# Patient Record
Sex: FEMALE | Race: WHITE | Employment: FULL TIME | URBAN - NONMETROPOLITAN AREA
[De-identification: names, ages, dates, MRNs, and addresses within clinical notes are randomized per-mention and may not be internally consistent; named-entity substitution may affect disease eponyms.]

---

## 2017-01-27 ENCOUNTER — TELEPHONE (OUTPATIENT)
Dept: SURGERY | Age: 52
End: 2017-01-27

## 2017-02-08 ENCOUNTER — TELEPHONE (OUTPATIENT)
Dept: SURGERY | Age: 52
End: 2017-02-08

## 2017-02-14 ENCOUNTER — HOSPITAL ENCOUNTER (OUTPATIENT)
Age: 52
Setting detail: SPECIMEN
Discharge: HOME | End: 2017-02-14
Payer: COMMERCIAL

## 2017-02-14 ENCOUNTER — OFFICE VISIT (OUTPATIENT)
Dept: OBGYN | Age: 52
End: 2017-02-14
Payer: COMMERCIAL

## 2017-02-14 VITALS
WEIGHT: 134 LBS | SYSTOLIC BLOOD PRESSURE: 112 MMHG | BODY MASS INDEX: 21.03 KG/M2 | DIASTOLIC BLOOD PRESSURE: 77 MMHG | HEART RATE: 65 BPM | HEIGHT: 67 IN

## 2017-02-14 DIAGNOSIS — Z01.419 WELL WOMAN EXAM WITH ROUTINE GYNECOLOGICAL EXAM: Primary | ICD-10-CM

## 2017-02-14 DIAGNOSIS — Z12.4 CERVICAL CANCER SCREENING: ICD-10-CM

## 2017-02-14 PROCEDURE — 87624 HPV HI-RISK TYP POOLED RSLT: CPT

## 2017-02-14 PROCEDURE — 88142 CYTOPATH C/V THIN LAYER: CPT

## 2017-02-14 PROCEDURE — 99396 PREV VISIT EST AGE 40-64: CPT | Performed by: NURSE PRACTITIONER

## 2017-02-14 ASSESSMENT — ENCOUNTER SYMPTOMS
EYES NEGATIVE: 1
GASTROINTESTINAL NEGATIVE: 1
RESPIRATORY NEGATIVE: 1

## 2017-02-16 ENCOUNTER — OFFICE VISIT (OUTPATIENT)
Dept: OTOLARYNGOLOGY | Age: 52
End: 2017-02-16
Payer: COMMERCIAL

## 2017-02-16 VITALS
OXYGEN SATURATION: 97 % | HEART RATE: 66 BPM | HEIGHT: 67 IN | BODY MASS INDEX: 21.06 KG/M2 | SYSTOLIC BLOOD PRESSURE: 118 MMHG | DIASTOLIC BLOOD PRESSURE: 78 MMHG | WEIGHT: 134.2 LBS

## 2017-02-16 DIAGNOSIS — H61.22 IMPACTED CERUMEN OF LEFT EAR: Primary | ICD-10-CM

## 2017-02-16 DIAGNOSIS — E04.1 THYROID NODULE: ICD-10-CM

## 2017-02-16 PROCEDURE — 99202 OFFICE O/P NEW SF 15 MIN: CPT | Performed by: OTOLARYNGOLOGY

## 2017-02-16 PROCEDURE — 69210 REMOVE IMPACTED EAR WAX UNI: CPT | Performed by: OTOLARYNGOLOGY

## 2017-02-16 ASSESSMENT — ENCOUNTER SYMPTOMS
EYES NEGATIVE: 1
GASTROINTESTINAL NEGATIVE: 1
RESPIRATORY NEGATIVE: 1
ALLERGIC/IMMUNOLOGIC NEGATIVE: 1

## 2017-02-21 ENCOUNTER — HOSPITAL ENCOUNTER (OUTPATIENT)
Dept: ULTRASOUND IMAGING | Age: 52
Discharge: HOME | End: 2017-02-21
Payer: COMMERCIAL

## 2017-02-21 DIAGNOSIS — E04.1 THYROID NODULE: ICD-10-CM

## 2017-02-21 LAB
HPV SOURCE: NORMAL
HPV, HIGH RISK: NEGATIVE

## 2017-02-21 PROCEDURE — 76536 US EXAM OF HEAD AND NECK: CPT

## 2017-03-13 ENCOUNTER — TELEPHONE (OUTPATIENT)
Dept: OTOLARYNGOLOGY | Age: 52
End: 2017-03-13

## 2017-03-23 ENCOUNTER — EMPLOYEE WELLNESS (OUTPATIENT)
Dept: OTHER | Age: 52
End: 2017-03-23

## 2017-03-23 LAB
CHOLESTEROL, TOTAL: 189 MG/DL (ref 160–199)
GLUCOSE BLD-MCNC: 84 MG/DL (ref 74–109)
HDLC SERPL-MCNC: 83 MG/DL (ref 65–121)
LDL CHOLESTEROL CALCULATED: 91 MG/DL
TRIGL SERPL-MCNC: 74 MG/DL (ref 150–199)

## 2017-04-24 ENCOUNTER — HOSPITAL ENCOUNTER (OUTPATIENT)
Dept: WOMENS IMAGING | Age: 52
Discharge: HOME | End: 2017-04-24
Payer: COMMERCIAL

## 2017-04-24 DIAGNOSIS — Z12.31 VISIT FOR SCREENING MAMMOGRAM: ICD-10-CM

## 2017-04-24 PROCEDURE — 77063 BREAST TOMOSYNTHESIS BI: CPT

## 2017-04-25 ENCOUNTER — TELEPHONE (OUTPATIENT)
Dept: WOMENS IMAGING | Age: 52
End: 2017-04-25

## 2017-04-26 ENCOUNTER — HOSPITAL ENCOUNTER (OUTPATIENT)
Dept: WOMENS IMAGING | Age: 52
Discharge: HOME | End: 2017-04-26
Payer: COMMERCIAL

## 2017-04-26 DIAGNOSIS — N64.89 BREAST ASYMMETRY: ICD-10-CM

## 2017-04-26 PROCEDURE — G0279 TOMOSYNTHESIS, MAMMO: HCPCS

## 2017-04-28 ENCOUNTER — OFFICE VISIT (OUTPATIENT)
Dept: SURGERY | Age: 52
End: 2017-04-28
Payer: COMMERCIAL

## 2017-04-28 VITALS
HEIGHT: 67 IN | DIASTOLIC BLOOD PRESSURE: 60 MMHG | WEIGHT: 131 LBS | HEART RATE: 68 BPM | BODY MASS INDEX: 20.56 KG/M2 | SYSTOLIC BLOOD PRESSURE: 110 MMHG

## 2017-04-28 DIAGNOSIS — Z12.31 VISIT FOR SCREENING MAMMOGRAM: Primary | ICD-10-CM

## 2017-04-28 PROCEDURE — 99212 OFFICE O/P EST SF 10 MIN: CPT | Performed by: PHYSICIAN ASSISTANT

## 2017-10-13 DIAGNOSIS — Z13.820 OSTEOPOROSIS SCREENING: Primary | ICD-10-CM

## 2017-10-30 ENCOUNTER — HOSPITAL ENCOUNTER (OUTPATIENT)
Dept: WOMENS IMAGING | Age: 52
Discharge: HOME | End: 2017-10-30
Payer: COMMERCIAL

## 2017-10-30 DIAGNOSIS — Z13.820 OSTEOPOROSIS SCREENING: ICD-10-CM

## 2017-10-30 PROCEDURE — 77080 DXA BONE DENSITY AXIAL: CPT

## 2017-11-04 DIAGNOSIS — M85.80 OSTEOPENIA, UNSPECIFIED LOCATION: Primary | ICD-10-CM

## 2017-11-04 RX ORDER — VITAMIN B COMPLEX
TABLET ORAL
Qty: 180 TABLET | Refills: 5 | Status: SHIPPED | OUTPATIENT
Start: 2017-11-04

## 2017-11-06 ENCOUNTER — OFFICE VISIT (OUTPATIENT)
Dept: PRIMARY CARE CLINIC | Age: 52
End: 2017-11-06
Payer: COMMERCIAL

## 2017-11-06 VITALS
HEIGHT: 67 IN | BODY MASS INDEX: 20.4 KG/M2 | DIASTOLIC BLOOD PRESSURE: 78 MMHG | RESPIRATION RATE: 18 BRPM | WEIGHT: 130 LBS | SYSTOLIC BLOOD PRESSURE: 110 MMHG

## 2017-11-06 DIAGNOSIS — E04.1 THYROID NODULE: ICD-10-CM

## 2017-11-06 DIAGNOSIS — M85.80 OSTEOPENIA, UNSPECIFIED LOCATION: ICD-10-CM

## 2017-11-06 DIAGNOSIS — Z00.00 ROUTINE GENERAL MEDICAL EXAMINATION AT A HEALTH CARE FACILITY: Primary | ICD-10-CM

## 2017-11-06 DIAGNOSIS — E55.9 VITAMIN D DEFICIENCY: ICD-10-CM

## 2017-11-06 DIAGNOSIS — M54.2 CERVICALGIA: ICD-10-CM

## 2017-11-06 DIAGNOSIS — M54.50 ACUTE MIDLINE LOW BACK PAIN WITHOUT SCIATICA: ICD-10-CM

## 2017-11-06 LAB — VITAMIN D 25-HYDROXY: 49.3 NG/ML

## 2017-11-06 PROCEDURE — 99213 OFFICE O/P EST LOW 20 MIN: CPT | Performed by: FAMILY MEDICINE

## 2017-11-06 PROCEDURE — G0439 PPPS, SUBSEQ VISIT: HCPCS | Performed by: FAMILY MEDICINE

## 2017-11-06 RX ORDER — METAXALONE 800 MG/1
800 TABLET ORAL 3 TIMES DAILY
Qty: 90 TABLET | Refills: 2 | Status: SHIPPED | OUTPATIENT
Start: 2017-11-06 | End: 2018-11-06 | Stop reason: SDUPTHER

## 2017-11-06 ASSESSMENT — ENCOUNTER SYMPTOMS
COUGH: 0
CONSTIPATION: 0
EYE ITCHING: 0
DIARRHEA: 0
SORE THROAT: 0
ABDOMINAL PAIN: 0
BACK PAIN: 1
NAUSEA: 0
COLOR CHANGE: 0
SHORTNESS OF BREATH: 0
RHINORRHEA: 0

## 2017-11-06 NOTE — PROGRESS NOTES
Abram Henry is a 47 y.o. female who presents today for   Chief Complaint   Patient presents with    Annual Exam     Separate encounter    Back Pain       HPI   Patient is here for 2 reasons: annual wellness visit as well as acute and chronic issues. The below review of systems and physical exam applies to both encounters. Annual HPI:  Patient is here for annual exam.  She had colonoscopy last year. Had dexa scan last week, shows osteopenia. Sees gynecology for routine woman's health. Remains active,. Denies any family history of any substantial early-stage cardiovascular risk, breast cancer colon cancer. Has questions about new shingles vaccine. She does not want to get it. She comes in contact with this work as well. Acute/Chronic HPI:  Has h/o vitamin D deficiency and was on 50,000 unit per week supple and previously. Currently taking 2000 units daily. Has some chronic neck pain but controlled with ibuprofen as well as Skelaxin. She does have some occasional back pain has made it difficult to jog and exercise. Getting worse of age. Denies loss of urinary or bowel continence, saddle anesthesia, muscle weakness or lower extremity sensation or function changes. No change in PMH, family, social, or surgical history unless mentioned above. Review of Systems   Constitutional: Negative for chills and fever. HENT: Negative for rhinorrhea and sore throat. Eyes: Negative for itching and visual disturbance. Respiratory: Negative for cough and shortness of breath. Cardiovascular: Negative for chest pain and palpitations. Gastrointestinal: Negative for abdominal pain, constipation, diarrhea and nausea. Endocrine: Negative for polydipsia and polyuria. Genitourinary: Negative for difficulty urinating, dysuria and frequency. Musculoskeletal: Positive for back pain and neck pain. Negative for arthralgias and myalgias. Skin: Negative for color change and rash. Allergic/Immunologic: Negative for environmental allergies and food allergies. Neurological: Negative for dizziness, weakness, light-headedness and numbness. Hematological: Negative for adenopathy. Does not bruise/bleed easily. Psychiatric/Behavioral: Negative for dysphoric mood. The patient is not nervous/anxious. Past Medical History:   Diagnosis Date    Thyroid nodule        Current Outpatient Prescriptions   Medication Sig Dispense Refill    metaxalone (SKELAXIN) 800 MG tablet Take 1 tablet by mouth 3 times daily 90 tablet 2    zoster vaccine live, PF, (ZOSTAVAX) 19511 UNT/0.65ML injection Inject 0.65 mLs into the skin once for 1 dose 0.65 mL 0    Calcium Carb-Cholecalciferol (CALCIUM-VITAMIN D) 500-400 MG-UNIT TABS 2 tabs daily 180 tablet 5    Cholecalciferol (VITAMIN D3) 5000 UNITS TABS Take by mouth      Multiple Vitamins-Minerals (MULTIVITAL PO) Take  by mouth.  Calcium-Vitamin D (CALCIUM + D PO) Take  by mouth. No current facility-administered medications for this visit.         No Known Allergies    Past Surgical History:   Procedure Laterality Date    BREAST CYST ASPIRATION Right 2007    BREAST CYST ASPIRATION  2016    BREAST CYST ASPIRATION  2015    DILATION AND CURETTAGE OF UTERUS  1991    ND COLONOSCOPY FLX DX W/COLLJ SPEC WHEN PFRMD N/A 12/8/2016    Dr Carlos oRbertson bleeding small to moderated sized internal hemorrhoids, tubulovillous AP, (- ) dysplasia--3 yr recall       Social History   Substance Use Topics    Smoking status: Never Smoker    Smokeless tobacco: Never Used    Alcohol use Yes      Comment: occasionally       Family History   Problem Relation Age of Onset    Heart Disease Paternal Grandfather     High Cholesterol Paternal Grandmother     Breast Cancer Maternal Grandmother [de-identified]    High Blood Pressure Father     High Cholesterol Father     Prostate Cancer Father     High Blood Pressure Mother     High Cholesterol Mother     Immunodeficiency Mother      low IGG       /78   Resp 18   Ht 5' 7\" (1.702 m)   Wt 130 lb (59 kg)   BMI 20.36 kg/m²     Physical Exam   Constitutional: She is oriented to person, place, and time. She appears well-developed and well-nourished. Non-toxic appearance. No distress. HENT:   Head: Normocephalic and atraumatic. Not macrocephalic and not microcephalic. Right Ear: External ear normal. No drainage. No decreased hearing is noted. Left Ear: External ear normal. No drainage. No decreased hearing is noted. Nose: Nose normal. No rhinorrhea or nasal deformity. Mouth/Throat: Uvula is midline, oropharynx is clear and moist and mucous membranes are normal. Mucous membranes are not pale and not dry. Eyes: Conjunctivae, EOM and lids are normal. Pupils are equal, round, and reactive to light. Right eye exhibits no discharge. Left eye exhibits no discharge. No scleral icterus. Neck: Normal range of motion and phonation normal. Neck supple. No tracheal deviation present. No thyromegaly present. Cardiovascular: Normal rate, regular rhythm, S1 normal, S2 normal and normal heart sounds. No extrasystoles are present. No murmur heard. Pulmonary/Chest: Effort normal and breath sounds normal. No respiratory distress. She has no wheezes. She has no rhonchi. She has no rales. Abdominal: Soft. Bowel sounds are normal. She exhibits no distension. There is no tenderness. There is no guarding. Musculoskeletal: She exhibits no edema. Cervical back: She exhibits decreased range of motion and tenderness. She exhibits no bony tenderness. Thoracic back: Normal. She exhibits no tenderness and no bony tenderness. Lumbar back: She exhibits decreased range of motion and tenderness. She exhibits no bony tenderness and no spasm. Right lower leg: She exhibits no swelling and no edema. Left lower leg: She exhibits no swelling and no edema. Lymphadenopathy:        Head (right side):  No

## 2018-03-06 ENCOUNTER — OFFICE VISIT (OUTPATIENT)
Dept: OBGYN | Age: 53
End: 2018-03-06
Payer: COMMERCIAL

## 2018-03-06 ENCOUNTER — HOSPITAL ENCOUNTER (OUTPATIENT)
Age: 53
Setting detail: SPECIMEN
Discharge: HOME | End: 2018-03-06
Payer: COMMERCIAL

## 2018-03-06 VITALS
WEIGHT: 130 LBS | SYSTOLIC BLOOD PRESSURE: 122 MMHG | BODY MASS INDEX: 20.4 KG/M2 | HEIGHT: 67 IN | DIASTOLIC BLOOD PRESSURE: 66 MMHG

## 2018-03-06 DIAGNOSIS — Z12.4 CERVICAL CANCER SCREENING: ICD-10-CM

## 2018-03-06 DIAGNOSIS — Z01.419 WELL FEMALE EXAM WITH ROUTINE GYNECOLOGICAL EXAM: Primary | ICD-10-CM

## 2018-03-06 PROCEDURE — 88142 CYTOPATH C/V THIN LAYER: CPT

## 2018-03-06 PROCEDURE — 99396 PREV VISIT EST AGE 40-64: CPT | Performed by: NURSE PRACTITIONER

## 2018-03-06 PROCEDURE — 87624 HPV HI-RISK TYP POOLED RSLT: CPT

## 2018-03-06 ASSESSMENT — ENCOUNTER SYMPTOMS
EYES NEGATIVE: 1
GASTROINTESTINAL NEGATIVE: 1
RESPIRATORY NEGATIVE: 1

## 2018-03-06 NOTE — PROGRESS NOTES
Ruddy Zendejas is a 47 y.o. female who presents today for her medical conditions/ complaints as noted below. Ruddy Zendejas is c/o of Gynecologic Exam        HPI  Pt here for annual and pap. Mammograms watched per Kunal Garrison at general surgery. Last mammogram: 4/2017, Michael Fink orders   Last pap: 2017  Contraception: none  Last bone density:2017  Last colonoscopy: 12/2016  Patient's last menstrual period was 11/01/2017. No obstetric history on file. Past Medical History:   Diagnosis Date    Thyroid nodule      Past Surgical History:   Procedure Laterality Date    BREAST CYST ASPIRATION Right 2007    BREAST CYST ASPIRATION  2016    BREAST CYST ASPIRATION  2015    DILATION AND CURETTAGE OF UTERUS  1991    NE COLONOSCOPY FLX DX W/COLLJ SPEC WHEN PFRMD N/A 12/8/2016    Dr Mary Arriaga bleeding small to moderated sized internal hemorrhoids, tubulovillous AP, (- ) dysplasia--3 yr recall     Family History   Problem Relation Age of Onset    Heart Disease Paternal Grandfather     High Cholesterol Paternal Grandmother     Breast Cancer Maternal Grandmother [de-identified]    High Blood Pressure Father     High Cholesterol Father     Prostate Cancer Father     High Blood Pressure Mother     High Cholesterol Mother     Immunodeficiency Mother      low IGG     Social History   Substance Use Topics    Smoking status: Never Smoker    Smokeless tobacco: Never Used    Alcohol use Yes      Comment: occasionally       Current Outpatient Prescriptions   Medication Sig Dispense Refill    metaxalone (SKELAXIN) 800 MG tablet Take 1 tablet by mouth 3 times daily 90 tablet 2    Calcium Carb-Cholecalciferol (CALCIUM-VITAMIN D) 500-400 MG-UNIT TABS 2 tabs daily 180 tablet 5    Multiple Vitamins-Minerals (MULTIVITAL PO) Take  by mouth.  Cholecalciferol (VITAMIN D3) 5000 UNITS TABS Take by mouth      Calcium-Vitamin D (CALCIUM + D PO) Take  by mouth. No current facility-administered medications for this visit.       No Known Allergies  Vitals:    03/06/18 1558   BP: 122/66     Body mass index is 20.36 kg/m². Review of Systems   Constitutional: Negative. HENT: Negative. Eyes: Negative. Respiratory: Negative. Cardiovascular: Negative. Gastrointestinal: Negative. Genitourinary: Negative for difficulty urinating, dyspareunia, dysuria, enuresis, frequency, hematuria, menstrual problem, pelvic pain, urgency and vaginal discharge. Musculoskeletal: Negative. Skin: Negative. Neurological: Negative. Psychiatric/Behavioral: Negative. Physical Exam   Constitutional: She is oriented to person, place, and time. She appears well-developed and well-nourished. Eyes: Conjunctivae are normal. Right eye exhibits no discharge. Neck: No thyroid mass and no thyromegaly present. Cardiovascular: Normal rate, regular rhythm and normal heart sounds. No murmur heard. Pulmonary/Chest: Effort normal and breath sounds normal. She has no wheezes. Right breast exhibits no inverted nipple, no mass, no nipple discharge, no skin change and no tenderness. Left breast exhibits no inverted nipple, no mass, no nipple discharge, no skin change and no tenderness. Breasts are symmetrical.   Abdominal: Soft. Bowel sounds are normal. She exhibits no distension and no mass. There is no tenderness. Hernia confirmed negative in the right inguinal area and confirmed negative in the left inguinal area. Genitourinary: Rectal exam shows no external hemorrhoid. No breast swelling, tenderness or discharge. There is no rash, tenderness or lesion on the right labia. There is no rash, tenderness or lesion on the left labia. Uterus is not enlarged and not tender. Cervix exhibits no motion tenderness and no discharge (normal cervical mucosa). Right adnexum displays no mass, no tenderness and no fullness. Left adnexum displays no mass, no tenderness and no fullness. No tenderness in the vagina. No vaginal discharge found.    Genitourinary

## 2018-03-13 LAB
HPV SOURCE: NORMAL
HPV, HIGH RISK: NEGATIVE

## 2018-03-20 ENCOUNTER — TELEPHONE (OUTPATIENT)
Dept: SURGERY | Age: 53
End: 2018-03-20

## 2018-03-20 VITALS — WEIGHT: 133 LBS | BODY MASS INDEX: 20.83 KG/M2

## 2018-03-28 ENCOUNTER — EMPLOYEE WELLNESS (OUTPATIENT)
Dept: OTHER | Age: 53
End: 2018-03-28

## 2018-03-28 LAB
CHOLESTEROL, TOTAL: 198 MG/DL (ref 160–199)
GLUCOSE BLD-MCNC: 87 MG/DL (ref 74–109)
HDLC SERPL-MCNC: 91 MG/DL (ref 65–121)
LDL CHOLESTEROL CALCULATED: 96 MG/DL
TRIGL SERPL-MCNC: 54 MG/DL (ref 0–149)

## 2018-04-02 VITALS — BODY MASS INDEX: 20.2 KG/M2 | WEIGHT: 129 LBS

## 2018-04-25 ENCOUNTER — HOSPITAL ENCOUNTER (OUTPATIENT)
Dept: WOMENS IMAGING | Age: 53
Discharge: HOME | End: 2018-04-25
Payer: COMMERCIAL

## 2018-04-25 DIAGNOSIS — Z12.31 VISIT FOR SCREENING MAMMOGRAM: ICD-10-CM

## 2018-04-25 PROCEDURE — 77063 BREAST TOMOSYNTHESIS BI: CPT

## 2018-04-27 ENCOUNTER — OFFICE VISIT (OUTPATIENT)
Dept: SURGERY | Age: 53
End: 2018-04-27
Payer: COMMERCIAL

## 2018-04-27 VITALS
BODY MASS INDEX: 20.62 KG/M2 | HEART RATE: 72 BPM | WEIGHT: 131.4 LBS | SYSTOLIC BLOOD PRESSURE: 118 MMHG | DIASTOLIC BLOOD PRESSURE: 72 MMHG | HEIGHT: 67 IN

## 2018-04-27 DIAGNOSIS — Z12.39 SCREENING BREAST EXAMINATION: Primary | ICD-10-CM

## 2018-04-27 PROCEDURE — 99212 OFFICE O/P EST SF 10 MIN: CPT | Performed by: PHYSICIAN ASSISTANT

## 2018-07-02 RX ORDER — AZITHROMYCIN 250 MG/1
TABLET, FILM COATED ORAL
Qty: 1 PACKET | Refills: 1 | Status: SHIPPED | OUTPATIENT
Start: 2018-07-02 | End: 2018-07-06

## 2018-11-06 ENCOUNTER — OFFICE VISIT (OUTPATIENT)
Dept: PRIMARY CARE CLINIC | Age: 53
End: 2018-11-06
Payer: COMMERCIAL

## 2018-11-06 VITALS
OXYGEN SATURATION: 99 % | WEIGHT: 121 LBS | RESPIRATION RATE: 20 BRPM | DIASTOLIC BLOOD PRESSURE: 68 MMHG | SYSTOLIC BLOOD PRESSURE: 98 MMHG | HEIGHT: 67 IN | BODY MASS INDEX: 18.99 KG/M2 | HEART RATE: 77 BPM

## 2018-11-06 DIAGNOSIS — Z00.00 ROUTINE GENERAL MEDICAL EXAMINATION AT A HEALTH CARE FACILITY: Primary | ICD-10-CM

## 2018-11-06 PROCEDURE — 99396 PREV VISIT EST AGE 40-64: CPT | Performed by: FAMILY MEDICINE

## 2018-11-06 PROCEDURE — 90715 TDAP VACCINE 7 YRS/> IM: CPT | Performed by: FAMILY MEDICINE

## 2018-11-06 PROCEDURE — 90471 IMMUNIZATION ADMIN: CPT | Performed by: FAMILY MEDICINE

## 2018-11-06 RX ORDER — MULTIVIT-MIN/IRON/FOLIC ACID/K 18-600-40
CAPSULE ORAL
COMMUNITY

## 2018-11-06 RX ORDER — METAXALONE 800 MG/1
800 TABLET ORAL 3 TIMES DAILY
Qty: 90 TABLET | Refills: 2 | Status: SHIPPED | OUTPATIENT
Start: 2018-11-06 | End: 2022-04-01

## 2018-11-06 ASSESSMENT — ENCOUNTER SYMPTOMS
SORE THROAT: 0
SHORTNESS OF BREATH: 0
COLOR CHANGE: 0
NAUSEA: 0
RHINORRHEA: 0
EYE ITCHING: 0
ABDOMINAL PAIN: 0
COUGH: 0

## 2018-11-06 ASSESSMENT — PATIENT HEALTH QUESTIONNAIRE - PHQ9
SUM OF ALL RESPONSES TO PHQ QUESTIONS 1-9: 0
SUM OF ALL RESPONSES TO PHQ9 QUESTIONS 1 & 2: 0
2. FEELING DOWN, DEPRESSED OR HOPELESS: 0
1. LITTLE INTEREST OR PLEASURE IN DOING THINGS: 0
SUM OF ALL RESPONSES TO PHQ QUESTIONS 1-9: 0

## 2018-11-21 DIAGNOSIS — Z00.00 ROUTINE GENERAL MEDICAL EXAMINATION AT A HEALTH CARE FACILITY: ICD-10-CM

## 2018-11-21 LAB
ALBUMIN SERPL-MCNC: 4.3 G/DL (ref 3.5–5.2)
ALP BLD-CCNC: 64 U/L (ref 35–104)
ALT SERPL-CCNC: 16 U/L (ref 5–33)
ANION GAP SERPL CALCULATED.3IONS-SCNC: 10 MMOL/L (ref 7–19)
AST SERPL-CCNC: 21 U/L (ref 5–32)
BILIRUB SERPL-MCNC: 0.5 MG/DL (ref 0.2–1.2)
BUN BLDV-MCNC: 18 MG/DL (ref 6–20)
CALCIUM SERPL-MCNC: 9.7 MG/DL (ref 8.6–10)
CHLORIDE BLD-SCNC: 102 MMOL/L (ref 98–111)
CO2: 28 MMOL/L (ref 22–29)
CREAT SERPL-MCNC: 0.7 MG/DL (ref 0.5–0.9)
GFR NON-AFRICAN AMERICAN: >60
GLUCOSE BLD-MCNC: 96 MG/DL (ref 74–109)
POTASSIUM SERPL-SCNC: 4.2 MMOL/L (ref 3.5–5)
SODIUM BLD-SCNC: 140 MMOL/L (ref 136–145)
TOTAL PROTEIN: 7.1 G/DL (ref 6.6–8.7)

## 2018-12-01 ENCOUNTER — PATIENT MESSAGE (OUTPATIENT)
Dept: PRIMARY CARE CLINIC | Age: 53
End: 2018-12-01

## 2018-12-18 RX ORDER — AZITHROMYCIN 250 MG/1
TABLET, FILM COATED ORAL
Qty: 6 TABLET | Refills: 0 | Status: SHIPPED | OUTPATIENT
Start: 2018-12-18 | End: 2018-12-28

## 2019-03-08 ENCOUNTER — OFFICE VISIT (OUTPATIENT)
Dept: OBGYN | Age: 54
End: 2019-03-08
Payer: COMMERCIAL

## 2019-03-08 VITALS
HEIGHT: 67 IN | BODY MASS INDEX: 21.19 KG/M2 | DIASTOLIC BLOOD PRESSURE: 72 MMHG | HEART RATE: 61 BPM | WEIGHT: 135 LBS | TEMPERATURE: 98.5 F | SYSTOLIC BLOOD PRESSURE: 111 MMHG

## 2019-03-08 DIAGNOSIS — Z11.51 SCREENING FOR HPV (HUMAN PAPILLOMAVIRUS): ICD-10-CM

## 2019-03-08 DIAGNOSIS — Z12.4 SCREENING FOR CERVICAL CANCER: ICD-10-CM

## 2019-03-08 DIAGNOSIS — N63.15 BREAST LUMP ON RIGHT SIDE AT 12 O'CLOCK POSITION: ICD-10-CM

## 2019-03-08 DIAGNOSIS — Z01.419 WELL WOMAN EXAM: Primary | ICD-10-CM

## 2019-03-08 PROCEDURE — 99396 PREV VISIT EST AGE 40-64: CPT | Performed by: NURSE PRACTITIONER

## 2019-03-08 ASSESSMENT — ENCOUNTER SYMPTOMS
EYES NEGATIVE: 1
GASTROINTESTINAL NEGATIVE: 1
RESPIRATORY NEGATIVE: 1

## 2019-03-13 LAB
HPV TYPE 16: NOT DETECTED
HPV TYPE 18: NOT DETECTED
INTERPRETATION: NORMAL
OTHER HIGH RISK HPV: NOT DETECTED
SOURCE: NORMAL

## 2019-04-05 LAB
CHOLESTEROL, TOTAL: 221 MG/DL (ref 160–199)
GLUCOSE BLD-MCNC: 85 MG/DL (ref 74–109)
HDLC SERPL-MCNC: 90 MG/DL (ref 65–121)
LDL CHOLESTEROL CALCULATED: 116 MG/DL
TRIGL SERPL-MCNC: 73 MG/DL (ref 0–149)

## 2019-04-12 ENCOUNTER — TELEPHONE (OUTPATIENT)
Dept: SURGERY | Age: 54
End: 2019-04-12

## 2019-04-12 NOTE — TELEPHONE ENCOUNTER
Patient called regarding upcoming mammogram appointment, she said she always has f/u with Debby Green after and she hasn't heard from anyone, please call.     CC: Lucio Walton

## 2019-04-26 ENCOUNTER — HOSPITAL ENCOUNTER (OUTPATIENT)
Dept: WOMENS IMAGING | Age: 54
Discharge: HOME | End: 2019-04-26
Payer: COMMERCIAL

## 2019-04-26 DIAGNOSIS — Z12.39 SCREENING BREAST EXAMINATION: ICD-10-CM

## 2019-04-26 PROCEDURE — 77063 BREAST TOMOSYNTHESIS BI: CPT

## 2019-05-17 ENCOUNTER — OFFICE VISIT (OUTPATIENT)
Dept: SURGERY | Age: 54
End: 2019-05-17
Payer: COMMERCIAL

## 2019-05-17 VITALS
DIASTOLIC BLOOD PRESSURE: 76 MMHG | HEIGHT: 67 IN | WEIGHT: 137 LBS | HEART RATE: 74 BPM | BODY MASS INDEX: 21.5 KG/M2 | SYSTOLIC BLOOD PRESSURE: 118 MMHG

## 2019-05-17 DIAGNOSIS — Z12.39 SCREENING BREAST EXAMINATION: Primary | ICD-10-CM

## 2019-05-17 PROCEDURE — 99213 OFFICE O/P EST LOW 20 MIN: CPT | Performed by: PHYSICIAN ASSISTANT

## 2019-05-31 NOTE — PROGRESS NOTES
HPI:  Rafael Ramos is in for yearly follow-up breast check. She has not noticed any changes in her breasts. EXAMINATION: [facility] DIGITAL SCREEN W OR WO CAD BILATERAL 4/26/2019 9:07   AM   HISTORY: 80-year-old female with a weak family history of breast   carcinoma, personal history of bilateral benign breast biopsies. Report: Today's examination consists of standard craniocaudal and   oblique views of both breasts.  3D tomographic views are also   obtained. Comparison is made with screening digital mammograms   4/25/2018, 4/24/2017 4/19/2016. The breast parenchyma is moderately dense, in a Pattern C parenchymal   pattern, which may lower the sensitivity of the study. No dominant   mass or parenchymal distortion is identified. There is a group of   calcifications in the left upper outer quadrant which are stable and   appear to represent benign milk of calcium. No suspicious   microcalcifications, skin thickening or nipple retraction is   identified. There is no significant change since the previous study.       Impression   Impression:    1. Stable mammograms, no suspicious features are identified. Annual   screening is recommended. 2. BI-RADS category 2, benign. BREAST EXAM:  On examination, she has fibrocystic changes throughout both breasts, no dominant masses, no skin or nipple changes and no axillary adenopathy. I see nothing suspicious for breast cancer. ASSESSMENT:  Benign fibrocystic changes                                 DISCUSSION:  I have stressed the importance of self breast exam and have explained the technique to her. We also discussed the pathophysiology of fibrocystic disease and breast cancer. She expresses good understanding. We also discussed multiple other issues regarding her and her family's health. PLAN:  I will plan to see her back in 1 year for physical exam and mammograms. She will contact me if anything significant changes.       I spent over 50% of visit time counseling patient. 15 minutes of face to face time with patient.

## 2019-11-07 ENCOUNTER — OFFICE VISIT (OUTPATIENT)
Dept: PRIMARY CARE CLINIC | Age: 54
End: 2019-11-07
Payer: COMMERCIAL

## 2019-11-07 VITALS
SYSTOLIC BLOOD PRESSURE: 118 MMHG | BODY MASS INDEX: 21.79 KG/M2 | WEIGHT: 138.8 LBS | HEIGHT: 67 IN | OXYGEN SATURATION: 98 % | RESPIRATION RATE: 14 BRPM | TEMPERATURE: 97.8 F | DIASTOLIC BLOOD PRESSURE: 76 MMHG | HEART RATE: 71 BPM

## 2019-11-07 DIAGNOSIS — M85.80 OSTEOPENIA, UNSPECIFIED LOCATION: ICD-10-CM

## 2019-11-07 DIAGNOSIS — R59.0 LYMPHADENOPATHY OF LEFT CERVICAL REGION: ICD-10-CM

## 2019-11-07 DIAGNOSIS — Z00.00 ROUTINE GENERAL MEDICAL EXAMINATION AT A HEALTH CARE FACILITY: Primary | ICD-10-CM

## 2019-11-07 DIAGNOSIS — H61.23 CERUMEN DEBRIS ON TYMPANIC MEMBRANE OF BOTH EARS: ICD-10-CM

## 2019-11-07 PROCEDURE — 99396 PREV VISIT EST AGE 40-64: CPT | Performed by: FAMILY MEDICINE

## 2019-11-07 ASSESSMENT — ENCOUNTER SYMPTOMS
NAUSEA: 0
BACK PAIN: 0
ABDOMINAL PAIN: 0
COUGH: 0
EYE ITCHING: 0
SHORTNESS OF BREATH: 0
SORE THROAT: 0
RHINORRHEA: 0
COLOR CHANGE: 0

## 2019-11-07 ASSESSMENT — PATIENT HEALTH QUESTIONNAIRE - PHQ9
SUM OF ALL RESPONSES TO PHQ QUESTIONS 1-9: 0
SUM OF ALL RESPONSES TO PHQ9 QUESTIONS 1 & 2: 0
1. LITTLE INTEREST OR PLEASURE IN DOING THINGS: 0
2. FEELING DOWN, DEPRESSED OR HOPELESS: 0
SUM OF ALL RESPONSES TO PHQ QUESTIONS 1-9: 0

## 2020-01-15 ENCOUNTER — TELEPHONE (OUTPATIENT)
Dept: GASTROENTEROLOGY | Age: 55
End: 2020-01-15

## 2020-01-15 NOTE — TELEPHONE ENCOUNTER
Gibran Russell, Mrs Priti Harris called and is due for a colon recall, and would like to have this done open access. Could you please call her to get this scheduled for her if she qualifies. Thanks so much!

## 2020-01-17 NOTE — TELEPHONE ENCOUNTER
This patient is now scheduled for OA Colonoscopy/Screen/hx polyp at [facility] with Mary Oneill on 2-10-20 @ 7:30 am arrival time. Patient works at [employer] and will come by the office to  sample of Plenvu Prep and her instructions, I will place them at  for her.  Carlos hare

## 2020-01-17 NOTE — TELEPHONE ENCOUNTER
1-17-20    I have called this patient on 1-17-20, she did not answer her phone, I left a VM @ 11:04 asking for a return call to discuss OA for her CLN Recall.  Carlos hare

## 2020-02-10 ENCOUNTER — APPOINTMENT (OUTPATIENT)
Dept: OPERATING ROOM | Age: 55
End: 2020-02-10

## 2020-02-10 ENCOUNTER — ANESTHESIA (OUTPATIENT)
Dept: OPERATING ROOM | Age: 55
End: 2020-02-10

## 2020-02-10 ENCOUNTER — ANESTHESIA EVENT (OUTPATIENT)
Dept: OPERATING ROOM | Age: 55
End: 2020-02-10

## 2020-02-10 ENCOUNTER — HOSPITAL ENCOUNTER (OUTPATIENT)
Age: 55
Setting detail: OUTPATIENT SURGERY
Discharge: HOME | End: 2020-02-10
Attending: INTERNAL MEDICINE | Admitting: INTERNAL MEDICINE
Payer: COMMERCIAL

## 2020-02-10 VITALS
SYSTOLIC BLOOD PRESSURE: 93 MMHG | WEIGHT: 138 LBS | RESPIRATION RATE: 18 BRPM | HEART RATE: 63 BPM | BODY MASS INDEX: 21.66 KG/M2 | HEIGHT: 67 IN | DIASTOLIC BLOOD PRESSURE: 59 MMHG | OXYGEN SATURATION: 100 %

## 2020-02-10 VITALS — DIASTOLIC BLOOD PRESSURE: 71 MMHG | SYSTOLIC BLOOD PRESSURE: 106 MMHG | OXYGEN SATURATION: 98 %

## 2020-02-10 PROCEDURE — 45378 DIAGNOSTIC COLONOSCOPY: CPT | Performed by: INTERNAL MEDICINE

## 2020-02-10 PROCEDURE — G0105 COLORECTAL SCRN; HI RISK IND: HCPCS

## 2020-02-10 PROCEDURE — G8907 PT DOC NO EVENTS ON DISCHARG: HCPCS

## 2020-02-10 PROCEDURE — G8918 PT W/O PREOP ORDER IV AB PRO: HCPCS

## 2020-02-10 RX ORDER — SODIUM CHLORIDE 9 MG/ML
INJECTION, SOLUTION INTRAVENOUS CONTINUOUS
Status: DISCONTINUED | OUTPATIENT
Start: 2020-02-10 | End: 2020-02-10 | Stop reason: HOSPADM

## 2020-02-10 RX ORDER — PROPOFOL 10 MG/ML
INJECTION, EMULSION INTRAVENOUS PRN
Status: DISCONTINUED | OUTPATIENT
Start: 2020-02-10 | End: 2020-02-10 | Stop reason: SDUPTHER

## 2020-02-10 RX ORDER — LIDOCAINE HYDROCHLORIDE 10 MG/ML
INJECTION, SOLUTION EPIDURAL; INFILTRATION; INTRACAUDAL; PERINEURAL PRN
Status: DISCONTINUED | OUTPATIENT
Start: 2020-02-10 | End: 2020-02-10 | Stop reason: SDUPTHER

## 2020-02-10 RX ADMIN — PROPOFOL 50 MG: 10 INJECTION, EMULSION INTRAVENOUS at 08:46

## 2020-02-10 RX ADMIN — PROPOFOL 50 MG: 10 INJECTION, EMULSION INTRAVENOUS at 08:40

## 2020-02-10 RX ADMIN — PROPOFOL 30 MG: 10 INJECTION, EMULSION INTRAVENOUS at 08:51

## 2020-02-10 RX ADMIN — LIDOCAINE HYDROCHLORIDE 50 MG: 10 INJECTION, SOLUTION EPIDURAL; INFILTRATION; INTRACAUDAL; PERINEURAL at 08:35

## 2020-02-10 RX ADMIN — PROPOFOL 100 MG: 10 INJECTION, EMULSION INTRAVENOUS at 08:35

## 2020-02-10 RX ADMIN — SODIUM CHLORIDE: 9 INJECTION, SOLUTION INTRAVENOUS at 07:52

## 2020-02-10 NOTE — H&P
No       Vital Signs:   Vitals:    02/10/20 0747   BP: 112/67   Pulse: 56   Resp: 18   SpO2: 99%        Physical Exam:  Cardiac:  [x]WNL  []Comments:  Pulmonary:  [x]WNL   []Comments:  Neuro/Mental Status:  [x]WNL  []Comments:  Abdominal:  [x]WNL    []Comments:  Other:   []WNL  []Comments:    Informed Consent:  The risks and benefits of the procedure have been discussed with either the patient or if they cannot consent, their representative. Assessment:  Patient examined and appropriate for planned sedation and procedure. Plan:  Proceed with planned sedation and procedure as above. Jarocho Lemons am scribing for and in the presence of Dr. Lazarus Docker, MD.  Electronically signed by Ariadna Rothman RN on 2/10/2020 at 8:02 AM    I personally performed the services described in this documentation as scribed by Delisa Espana, and it appears accurate and complete.      Lazarus Docker, MD  2/10/2020

## 2020-02-10 NOTE — ANESTHESIA PRE PROCEDURE
Counseling given: Not Answered      Vital Signs (Current):   Vitals:    02/10/20 0747   BP: 112/67   Pulse: 56   Resp: 18   SpO2: 99%   Weight: 138 lb (62.6 kg)   Height: 5' 7\" (1.702 m)                                              BP Readings from Last 3 Encounters:   02/10/20 112/67   11/07/19 118/76   05/17/19 118/76       NPO Status: Time of last liquid consumption: 0330                        Time of last solid consumption: 2300                        Date of last liquid consumption: 02/10/20                        Date of last solid food consumption: 02/08/20    BMI:   Wt Readings from Last 3 Encounters:   02/10/20 138 lb (62.6 kg)   11/07/19 138 lb 12.8 oz (63 kg)   05/17/19 137 lb (62.1 kg)     Body mass index is 21.61 kg/m². CBC:   Lab Results   Component Value Date    WBC 4.5 11/18/2016    RBC 4.81 11/18/2016    HGB 14.7 11/18/2016    HCT 43.8 11/18/2016    MCV 91.1 11/18/2016    RDW 12.3 11/18/2016     11/18/2016       CMP:   Lab Results   Component Value Date     11/21/2018    K 4.2 11/21/2018     11/21/2018    CO2 28 11/21/2018    BUN 18 11/21/2018    CREATININE 0.7 11/21/2018    LABGLOM >60 11/21/2018    GLUCOSE 85 04/05/2019    PROT 7.1 11/21/2018    CALCIUM 9.7 11/21/2018    BILITOT 0.5 11/21/2018    ALKPHOS 64 11/21/2018    AST 21 11/21/2018    ALT 16 11/21/2018       POC Tests: No results for input(s): POCGLU, POCNA, POCK, POCCL, POCBUN, POCHEMO, POCHCT in the last 72 hours.     Coags: No results found for: PROTIME, INR, APTT    HCG (If Applicable): No results found for: PREGTESTUR, PREGSERUM, HCG, HCGQUANT     ABGs: No results found for: PHART, PO2ART, TUK0MXS, SQJ9AGW, BEART, H1YOVNRI     Type & Screen (If Applicable):  No results found for: LABABO, [address]    Anesthesia Evaluation  Patient summary reviewed and Nursing notes reviewed no history of anesthetic complications:   Airway: Mallampati: I  TM distance: >3 FB   Neck ROM: full  Mouth

## 2020-03-05 ENCOUNTER — EMPLOYEE WELLNESS (OUTPATIENT)
Dept: OTHER | Age: 55
End: 2020-03-05

## 2020-03-05 LAB
CHOLESTEROL, TOTAL: 199 MG/DL (ref 160–199)
GLUCOSE BLD-MCNC: 80 MG/DL (ref 74–109)
HDLC SERPL-MCNC: 86 MG/DL (ref 65–121)
LDL CHOLESTEROL CALCULATED: 96 MG/DL
TRIGL SERPL-MCNC: 85 MG/DL (ref 0–149)

## 2020-03-07 LAB
3-OH-COTININE: <2 NG/ML
COTININE: <2 NG/ML
NICOTINE: <2 NG/ML

## 2020-03-13 ENCOUNTER — OFFICE VISIT (OUTPATIENT)
Dept: OBGYN | Age: 55
End: 2020-03-13
Payer: COMMERCIAL

## 2020-03-13 VITALS
DIASTOLIC BLOOD PRESSURE: 73 MMHG | HEART RATE: 61 BPM | BODY MASS INDEX: 21.93 KG/M2 | SYSTOLIC BLOOD PRESSURE: 124 MMHG | WEIGHT: 140 LBS

## 2020-03-13 PROCEDURE — 99396 PREV VISIT EST AGE 40-64: CPT | Performed by: NURSE PRACTITIONER

## 2020-03-13 ASSESSMENT — ENCOUNTER SYMPTOMS
GASTROINTESTINAL NEGATIVE: 1
RESPIRATORY NEGATIVE: 1
EYES NEGATIVE: 1

## 2020-03-13 NOTE — PATIENT INSTRUCTIONS
also a good idea to know your test results and keep a list of the medicines you take. How can you care for yourself at home? · Reach and stay at a healthy weight. This will lower your risk for many problems, such as obesity, diabetes, heart disease, and high blood pressure. · Get at least 30 minutes of physical activity on most days of the week. Walking is a good choice. You also may want to do other activities, such as running, swimming, cycling, or playing tennis or team sports. Discuss any changes in your exercise program with your doctor. · Do not smoke or allow others to smoke around you. If you need help quitting, talk to your doctor about stop-smoking programs and medicines. These can increase your chances of quitting for good. · Talk to your doctor about whether you have any risk factors for sexually transmitted infections (STIs). Having one sex partner (who does not have STIs and does not have sex with anyone else) is a good way to avoid these infections. · Use birth control if you do not want to have children at this time. Talk with your doctor about the choices available and what might be best for you. · Protect your skin from too much sun. When you're outdoors from 10 a.m. to 4 p.m., stay in the shade or cover up with clothing and a hat with a wide brim. Wear sunglasses that block UV rays. Even when it's cloudy, put broad-spectrum sunscreen (SPF 30 or higher) on any exposed skin. · See a dentist one or two times a year for checkups and to have your teeth cleaned. · Wear a seat belt in the car. Follow your doctor's advice about when to have certain tests. These tests can spot problems early. For everyone  · Cholesterol. Have the fat (cholesterol) in your blood tested after age 21. Your doctor will tell you how often to have this done based on your age, family history, or other things that can increase your risk for heart disease. · Blood pressure.  Have your blood pressure checked during a routine doctor visit. Your doctor will tell you how often to check your blood pressure based on your age, your blood pressure results, and other factors. · Vision. Talk with your doctor about how often to have a glaucoma test.  · Diabetes. Ask your doctor whether you should have tests for diabetes. · Colon cancer. Your risk for colorectal cancer gets higher as you get older. Some experts say that adults should start regular screening at age 48 and stop at age 76. Others say to start before age 48 or continue after age 76. Talk with your doctor about your risk and when to start and stop screening. For women  · Breast exam and mammogram. Talk to your doctor about when you should have a clinical breast exam and a mammogram. Medical experts differ on whether and how often women under 50 should have these tests. Your doctor can help you decide what is right for you. · Cervical cancer screening test and pelvic exam. Begin with a Pap test at age 24. The test often is part of a pelvic exam. Starting at age 27, you may choose to have a Pap test, an HPV test, or both tests at the same time (called co-testing). Talk with your doctor about how often to have testing. · Tests for sexually transmitted infections (STIs). Ask whether you should have tests for STIs. You may be at risk if you have sex with more than one person, especially if your partners do not wear condoms. For men  · Tests for sexually transmitted infections (STIs). Ask whether you should have tests for STIs. You may be at risk if you have sex with more than one person, especially if you do not wear a condom. · Testicular cancer exam. Ask your doctor whether you should check your testicles regularly. · Prostate exam. Talk to your doctor about whether you should have a blood test (called a PSA test) for prostate cancer.  Experts differ on whether and when men should have this test. Some experts suggest it if you are older than 39 and are -American or have a father or brother who got prostate cancer when he was younger than 72. When should you call for help? Watch closely for changes in your health, and be sure to contact your doctor if you have any problems or symptoms that concern you. Where can you learn more? Go to https://reji[facility]. org and sign in to your [employer] account. Enter P072 in the [facility] box to learn more about \"Well Visit, Ages 25 to 48: Care Instructions. \"     If you do not have an account, please click on the \"Sign Up Now\" link. Current as of: August 21, 2019  Content Version: 12.3  © 9722-9618 [employer]. Care instructions adapted under license by [facility] ([facility]). If you have questions about a medical condition or this instruction, always ask your healthcare professional. [address]  any warranty or liability for your use of this information. Patient Education        Human Papillomavirus (HPV): Care Instructions  Your Care Instructions  The human papillomavirus (HPV) is a very common virus. There are many types of HPV. Some types cause the common skin wart. Other types cause genital warts, which can be spread by sexual contact. Some types can increase the risk for cervical and anal cancer. Having one type of HPV does not lead to having another type. Many women who have HPV may not know that they are infected until it is found with a Pap test. Your doctor uses this test to look for abnormal cells on your cervix. If you have had an abnormal Pap test, your doctor may recommend that you have an HPV test.  Like a Pap test, an HPV test is done on a sample of cells collected from the cervix. If the test finds that you have the types of HPV that might lead to cancer, your doctor may suggest more tests. This does not mean that you will develop cancer; it means that you may have an increased risk. Abnormal cell changes caused by HPV often go away on their own.  If the

## 2020-03-13 NOTE — PROGRESS NOTES
that overlap. ? Use three levels of pressure to feel of all your breast tissue. Use light pressure to feel the tissue close to the skin surface. Use medium pressure to feel a little deeper. Use firm pressure to feel your tissue close to your breastbone and ribs. Use each pressure level to feel your breast tissue before moving on to the next spot. ? Check your entire breast, moving up and down as if following a strip from the collarbone to the bra line, and from the armpit to the ribs. Repeat until you have covered the entire breast.  ? Repeat this procedure for your left breast, using the pads of the 3 middle fingers of your right hand. · To examine your breasts while in the shower:  ? Place one arm over your head and lightly soap your breast on that side. ? Using the pads of your fingers, gently move your hand over your breast (in the strip pattern described above), feeling carefully for any lumps or changes. ? Repeat for the other breast.  · Have your doctor inspect anything you notice to see if you need further testing. Where can you learn more? Go to [email or web address]://Mizzen+Mainpeyavalu.Stylr. org and sign in to your Spotted account. Enter P148 in the [employer] box to learn more about \"Breast Self-Exam: Care Instructions. \"     If you do not have an account, please click on the \"Sign Up Now\" link. Current as of: August 21, 2019  Content Version: 12.3  © 1108-3628 [employer]. Care instructions adapted under license by [facility] ([facility]). If you have questions about a medical condition or this instruction, always ask your healthcare professional. [place] [address] any warranty or liability for your use of this information. Patient Education        Well Visit, Ages 25 to 48: Care Instructions  Your Care Instructions    Physical exams can help you stay healthy. Your doctor has checked your overall health and may have suggested ways to take good care of yourself.  He or from the cervix. If the test finds that you have the types of HPV that might lead to cancer, your doctor may suggest more tests. This does not mean that you will develop cancer; it means that you may have an increased risk. Abnormal cell changes caused by HPV often go away on their own. If the changes do not go away, they can be treated. But because HPV can stay inside the body, the abnormal cervical cells sometimes come back. This is why it is important to follow up with your doctor and have regular Pap tests. Follow-up care is a key part of your treatment and safety. Be sure to make and go to all appointments, and call your doctor if you are having problems. It's also a good idea to know your test results and keep a list of the medicines you take. How can you care for yourself at home? · If you are going to have a Pap or HPV test, do not douche or use tampons or vaginal creams in the 24 hours before the test.  · Do not smoke. Smoking increases the risk for cervical problems and abnormal Pap tests. If you need help quitting, talk to your doctor about stop-smoking programs and medicines. These can increase your chances of quitting for good. · Use latex condoms every time you have sex. Use them from the beginning to the end of sexual contact. · Be sure to tell your sexual partner or partners that you have HPV. Even if you do not have symptoms, you can still pass HPV to others. · Having one sex partner (who does not have STIs and does not have sex with anyone else) is a good way to avoid STIs. When should you call for help? Watch closely for changes in your health, and be sure to contact your doctor if:    · You have vaginal pain during or after sex.     · You have vaginal bleeding when you are not in your menstrual period. Where can you learn more? Go to https://reji.health-partners. org and sign in to your Lively account.  Enter F690 in the [employer] box to learn more about \"Human

## 2020-03-18 LAB
HPV COMMENT: NORMAL
HPV TYPE 16: NOT DETECTED
HPV TYPE 18: NOT DETECTED
HPVOH (OTHER TYPES): NOT DETECTED

## 2020-06-02 ENCOUNTER — HOSPITAL ENCOUNTER (OUTPATIENT)
Dept: WOMENS IMAGING | Age: 55
Discharge: HOME | End: 2020-06-02
Payer: COMMERCIAL

## 2020-06-02 PROCEDURE — 77063 BREAST TOMOSYNTHESIS BI: CPT

## 2020-06-05 ENCOUNTER — OFFICE VISIT (OUTPATIENT)
Dept: SURGERY | Age: 55
End: 2020-06-05
Payer: COMMERCIAL

## 2020-06-05 VITALS — HEART RATE: 80 BPM | DIASTOLIC BLOOD PRESSURE: 72 MMHG | SYSTOLIC BLOOD PRESSURE: 118 MMHG

## 2020-06-05 PROCEDURE — 99213 OFFICE O/P EST LOW 20 MIN: CPT | Performed by: PHYSICIAN ASSISTANT

## 2020-06-05 NOTE — PROGRESS NOTES
HPI:  Gabino Mosley is in for yearly follow-up breast check. She has not noticed any changes in her breasts. EXAMINATION:  [facility]LEY DIGITAL SCREEN W OR WO CAD BILATERAL  6/2/2020 8:20   AM   CLINICAL HISTORY: Screening. Eben Uribe is a family history of breast   cancer in a maternal grandmother diagnosed at age [de-identified]. Prior bilateral   breast aspirations. COMPARISON STUDIES: 4/26/2019, 4/25/2018 and 4/24/2017. BREAST DENSITY: The breasts are heterogeneously dense which may lower   the sensitivity of mammography (Pattern C). Digital and 3-D   mammography were performed. Computer aided detection was utilized. TECHNIQUE: Digital 2-D and 3-D mammography were performed. Computer   aided detection was utilized. The patient's information has been added to a reminder sytem with a   target due date for the next mammogram.   FINDINGS: There are no dominant masses. There are scattered benign   calcifications on the left, stable. There are no suspicious   microcalcifications. There is no skin thickening or other abnormality.       Impression   1. Benign mammogram. BI-RADS 2.   2. Screening mammography recommended in one year. BREAST EXAM:  On examination, she has fibrocystic changes throughout both breasts, no dominant masses, no skin or nipple changes and no axillary adenopathy. I see nothing suspicious for breast cancer. ASSESSMENT:  Benign fibrocystic changes              PLAN:  I will plan to see her back in 1 year for physical exam and bilateral mammograms. She will contact me if anything significant changes. 15 minutes spent, which includes face to face with patient, record review, evaluation, planning, and education. I spent over 50% of this visit counseling patient.

## 2020-10-19 VITALS — BODY MASS INDEX: 20.36 KG/M2 | WEIGHT: 130 LBS

## 2020-11-11 ENCOUNTER — OFFICE VISIT (OUTPATIENT)
Dept: PRIMARY CARE CLINIC | Age: 55
End: 2020-11-11
Payer: COMMERCIAL

## 2020-11-11 VITALS
HEART RATE: 78 BPM | DIASTOLIC BLOOD PRESSURE: 72 MMHG | WEIGHT: 137 LBS | SYSTOLIC BLOOD PRESSURE: 110 MMHG | TEMPERATURE: 98 F | OXYGEN SATURATION: 98 % | BODY MASS INDEX: 21.5 KG/M2 | HEIGHT: 67 IN

## 2020-11-11 PROCEDURE — 99396 PREV VISIT EST AGE 40-64: CPT | Performed by: FAMILY MEDICINE

## 2020-11-11 PROCEDURE — 69210 REMOVE IMPACTED EAR WAX UNI: CPT | Performed by: FAMILY MEDICINE

## 2020-11-11 ASSESSMENT — ENCOUNTER SYMPTOMS
COLOR CHANGE: 0
EYE ITCHING: 0
SHORTNESS OF BREATH: 0
ABDOMINAL PAIN: 0
RHINORRHEA: 0
COUGH: 0
SORE THROAT: 0
NAUSEA: 0

## 2020-11-11 ASSESSMENT — PATIENT HEALTH QUESTIONNAIRE - PHQ9
2. FEELING DOWN, DEPRESSED OR HOPELESS: 0
SUM OF ALL RESPONSES TO PHQ QUESTIONS 1-9: 0
SUM OF ALL RESPONSES TO PHQ QUESTIONS 1-9: 0
SUM OF ALL RESPONSES TO PHQ9 QUESTIONS 1 & 2: 0
1. LITTLE INTEREST OR PLEASURE IN DOING THINGS: 0
SUM OF ALL RESPONSES TO PHQ QUESTIONS 1-9: 0

## 2020-11-11 NOTE — PROGRESS NOTES
Nahed Paul is a 48 y.o. female who presents today for   Chief Complaint   Patient presents with    Annual Exam     Request bilateral ear looked at,no problems-hx of ear wax       HPI  Patient is here for annual.  She notes hr L ear is bothering her. She notes that it has been tried to be flushed previously. She has had in the past some back and neck pain but stretching helps it. Has a small area on the thigh for about one yr w/ preceding tick bite, has some cats at home. Denies any pulling sensation of the groin. Denies any irregular vaginal discharge or bleeding. Denies any pain with the area. No change in PMH, family, social, or surgical history unless mentioned above. Review of Systems   Constitutional: Negative for chills and fever. HENT: Positive for hearing loss. Negative for ear pain, rhinorrhea and sore throat. Eyes: Negative for itching and visual disturbance. Respiratory: Negative for cough and shortness of breath. Cardiovascular: Negative for chest pain and palpitations. Gastrointestinal: Negative for abdominal pain and nausea. Endocrine: Negative for polydipsia and polyuria. Genitourinary: Negative for dysuria and frequency. Musculoskeletal: Negative for arthralgias and myalgias. Skin: Negative for color change and rash. Allergic/Immunologic: Negative for environmental allergies and food allergies. Neurological: Negative for dizziness and light-headedness. Hematological: Negative for adenopathy. Does not bruise/bleed easily. Psychiatric/Behavioral: Negative for dysphoric mood. The patient is not nervous/anxious.         Past Medical History:   Diagnosis Date    Thyroid nodule        Current Outpatient Medications   Medication Sig Dispense Refill    Cholecalciferol (VITAMIN D) 2000 units CAPS capsule Take by mouth      metaxalone (SKELAXIN) 800 MG tablet Take 1 tablet by mouth 3 times daily (Patient taking differently: Take 800 mg by mouth 3 times daily as needed ) 90 tablet 2    Calcium Carb-Cholecalciferol (CALCIUM-VITAMIN D) 500-400 MG-UNIT TABS 2 tabs daily 180 tablet 5    Multiple Vitamins-Minerals (MULTIVITAL PO) Take  by mouth. No current facility-administered medications for this visit. No Known Allergies    Past Surgical History:   Procedure Laterality Date    BREAST CYST ASPIRATION Right 2007    BREAST CYST ASPIRATION  2016    BREAST CYST ASPIRATION  2015    COLONOSCOPY N/A 2/10/2020    Dr Salazar Conception, 5 yr recall    DILATION AND CURETTAGE OF UTERUS  1991    [place] COLONOSCOPY FLX DX W/COLLJ SPEC WHEN PFRMD N/A 12/8/2016    Dr Tony Breaktamica bleeding small to moderated sized internal hemorrhoids, tubulovillous AP, (- ) dysplasia--3 yr recall       Social History     Tobacco Use    Smoking status: Never Smoker    Smokeless tobacco: Never Used   Substance Use Topics    Alcohol use: Yes     Comment: occasionally    Drug use: No       Family History   Problem Relation Age of Onset    Heart Disease Paternal Grandfather     High Cholesterol Paternal Grandmother     Breast Cancer Maternal Grandmother [de-identified]    High Blood Pressure Father     High Cholesterol Father     Prostate Cancer Father     High Blood Pressure Mother     High Cholesterol Mother     Immunodeficiency Mother         low IGG       /72   Pulse 78   Temp 98 °F (36.7 °C) (Temporal)   Ht 5' 7\" (1.702 m)   Wt 137 lb (62.1 kg)   LMP 11/01/2017 (Exact Date)   SpO2 98%   BMI 21.46 kg/m²     Physical Exam  Vitals signs and nursing note reviewed. Constitutional:       General: She is not in acute distress. Appearance: She is well-developed. She is not toxic-appearing or diaphoretic. HENT:      Head: Normocephalic and atraumatic. Not macrocephalic and not microcephalic. Right Ear: External ear normal. No decreased hearing noted. No drainage (impacted cerumenpartially). Left Ear: External ear normal. Decreased hearing noted.  No drainage (impacted cerumen). Nose: Nose normal. No nasal deformity or rhinorrhea. Mouth/Throat:      Mouth: Mucous membranes are not pale and not dry. Pharynx: Uvula midline. Eyes:      General: Lids are normal. No scleral icterus. Right eye: No discharge. Left eye: No discharge. Conjunctiva/sclera: Conjunctivae normal.      Pupils: Pupils are equal, round, and reactive to light. Neck:      Musculoskeletal: Normal range of motion and neck supple. Thyroid: No thyromegaly. Trachea: Phonation normal. No tracheal deviation. Cardiovascular:      Rate and Rhythm: Normal rate and regular rhythm. No extrasystoles are present. Heart sounds: Normal heart sounds, S1 normal and S2 normal. No murmur. Pulmonary:      Effort: Pulmonary effort is normal. No respiratory distress. Breath sounds: Normal breath sounds. No wheezing, rhonchi or rales. Abdominal:      General: Bowel sounds are normal. There is no distension. Palpations: Abdomen is soft. Tenderness: There is no abdominal tenderness. There is no guarding. Musculoskeletal: Normal range of motion. General: No tenderness. Cervical back: Normal. She exhibits no tenderness and no bony tenderness. Thoracic back: Normal. She exhibits no tenderness and no bony tenderness. Lumbar back: Normal. She exhibits no tenderness and no bony tenderness. Right lower leg: She exhibits no swelling. No edema. Left lower leg: She exhibits no swelling. No edema. Lymphadenopathy:      Head:      Right side of head: No submental, submandibular or tonsillar adenopathy. Left side of head: No submental, submandibular or tonsillar adenopathy. Cervical: No cervical adenopathy. Upper Body:      Right upper body: No supraclavicular adenopathy. Left upper body: No supraclavicular adenopathy. Lower Body: No right inguinal adenopathy. No left inguinal adenopathy.    Skin:     General: Skin is dry.      Coloration: Skin is not pale. Findings: No erythema (on head, neck, face, extremities), lesion (subQ mass 1\" diameter approx 3\" below anteromedial aspect of inguinal ligament R thigh) or rash (on extremities, head, neck, face). Nails: There is no clubbing. Neurological:      Mental Status: She is alert and oriented to person, place, and time. Motor: No tremor or seizure activity. Gait: Gait normal.      Deep Tendon Reflexes: Reflexes are normal and symmetric. Psychiatric:         Speech: Speech normal.         Behavior: Behavior normal.         Thought Content: Thought content normal.         Judgment: Judgment normal.         Assessment:    ICD-10-CM    1. Routine general medical examination at a health care facility  Z00.00    2. Osteopenia, unspecified location  M85.80 DEXA BONE DENSITY 2 SITES   3. Hearing loss of left ear due to cerumen impaction  H61.22 AR REMOVAL IMPACTED CERUMEN INSTRUMENTATION UNILAT   4. Mass of right thigh  R22.41        Plan:   Suspect that this is likely a benign lesion such as a sebaceous cyst versus lipoma but given its somewhat proximity to the inguinal lymph node chain, we will do ultrasound based on this duration as well. Patient also had bone density testing based on osteopenia from 3 years prior. Overall very healthy continue current precautions. Orders Placed This Encounter   Procedures    DEXA BONE DENSITY 2 SITES     Standing Status:   Future     Standing Expiration Date:   11/11/2021     Order Specific Question:   Reason for exam:     Answer:   osteopenia    US PELVIS LIMITED     Standing Status:   Future     Standing Expiration Date:   11/11/2021     Order Specific Question:   Reason for exam:     Answer:   area of interest R upper thigh for 1\" mass, suspect lipoma vs sebaceous cyst    AR REMOVAL IMPACTED CERUMEN INSTRUMENTATION UNILAT     No orders of the defined types were placed in this encounter.     There are no discontinued medications. Patient Instructions   We are committed to providing you with the best care possible. In order to help us achieve these goals please remember to bring all medications, herbal products, and over the counter supplements with you to each visit. If your provider has ordered testing for you, please be sure to follow up with our office if you have not received results within 7 days after the testing took place. *If you receive a survey after visiting one of our offices, please take time to share your experience concerning your physician office visit. These surveys are confidential and no health information about you is shared. We are eager to improve for you and we are counting on your feedback to help make that happen. Patient given educational handouts and has had all questions answered. Patient voices understanding and agrees to plans along with risks and benefits of plan. Patient isinstructed to continue prior meds, diet, and exercise plans unless instructed otherwise. Patient agrees to follow up as instructed and sooner if needed. Patient agrees to go to ER if condition becomes emergent. Notesmay be completed with dictation device and spelling errors may occur. Materials may be copied and pasted from a notepad outside of EMR, all of which, I, Dr. Janes Rodriguez MD, take sole intellectual ownership of and have approved adding to my note. Return in about 1 year (around 11/11/2021) for OV (Jennifer), annual 2 time slots.

## 2021-03-26 ENCOUNTER — OFFICE VISIT (OUTPATIENT)
Dept: OBGYN CLINIC | Age: 56
End: 2021-03-26
Payer: COMMERCIAL

## 2021-03-26 VITALS
SYSTOLIC BLOOD PRESSURE: 114 MMHG | HEART RATE: 74 BPM | WEIGHT: 138 LBS | DIASTOLIC BLOOD PRESSURE: 64 MMHG | BODY MASS INDEX: 21.61 KG/M2

## 2021-03-26 DIAGNOSIS — Z01.419 WOMEN'S ANNUAL ROUTINE GYNECOLOGICAL EXAMINATION: Primary | ICD-10-CM

## 2021-03-26 DIAGNOSIS — Z11.51 SCREENING FOR HPV (HUMAN PAPILLOMAVIRUS): ICD-10-CM

## 2021-03-26 DIAGNOSIS — Z12.4 SCREENING FOR CERVICAL CANCER: ICD-10-CM

## 2021-03-26 DIAGNOSIS — N81.6 RECTOCELE: ICD-10-CM

## 2021-03-26 DIAGNOSIS — Z12.31 ENCOUNTER FOR SCREENING MAMMOGRAM FOR MALIGNANT NEOPLASM OF BREAST: ICD-10-CM

## 2021-03-26 PROCEDURE — 99396 PREV VISIT EST AGE 40-64: CPT | Performed by: NURSE PRACTITIONER

## 2021-03-26 ASSESSMENT — ENCOUNTER SYMPTOMS
EYES NEGATIVE: 1
GASTROINTESTINAL NEGATIVE: 1
RESPIRATORY NEGATIVE: 1

## 2021-03-26 NOTE — PROGRESS NOTES
Segundo Morales is a 48 y.o. female who presents today for her medical conditions/ complaints as noted below. Segundo Morales is c/o of Gynecologic Exam        HPI  Pt presents today for pap smear and breast exam.      Last mammogram:  6/2020 normal  Last pap smear:  3/2020 normal  Contraception:  postmenopausal  Last bone density:  11/2020  Last colonoscopy:   2/2020  Patient's last menstrual period was 11/01/2017 (exact date). No obstetric history on file.     Past Medical History:   Diagnosis Date    Thyroid nodule      Past Surgical History:   Procedure Laterality Date    BREAST CYST ASPIRATION Right 2007    BREAST CYST ASPIRATION  2016    BREAST CYST ASPIRATION  2015    COLONOSCOPY N/A 2/10/2020    Dr Hansel Velasco, 5 yr recall    DILATION AND CURETTAGE OF UTERUS  1991    [place] COLONOSCOPY FLX DX W/COLLJ SPEC WHEN PFRMD N/A 12/8/2016    Dr Liane Katz bleeding small to moderated sized internal hemorrhoids, tubulovillous AP, (- ) dysplasia--3 yr recall     Family History   Problem Relation Age of Onset    Heart Disease Paternal Grandfather     High Cholesterol Paternal Grandmother     Breast Cancer Maternal Grandmother [de-identified]    High Blood Pressure Father     High Cholesterol Father     Prostate Cancer Father     High Blood Pressure Mother     High Cholesterol Mother     Immunodeficiency Mother         low IGG     Social History     Tobacco Use    Smoking status: Never Smoker    Smokeless tobacco: Never Used   Substance Use Topics    Alcohol use: Yes     Comment: occasionally       Current Outpatient Medications   Medication Sig Dispense Refill    Cholecalciferol (VITAMIN D) 2000 units CAPS capsule Take by mouth      metaxalone (SKELAXIN) 800 MG tablet Take 1 tablet by mouth 3 times daily (Patient taking differently: Take 800 mg by mouth 3 times daily as needed ) 90 tablet 2    Calcium Carb-Cholecalciferol (CALCIUM-VITAMIN D) 500-400 MG-UNIT TABS 2 tabs daily 180 tablet 5    Multiple Vitamins-Minerals (MULTIVITAL PO) Take  by mouth. No current facility-administered medications for this visit. No Known Allergies  Vitals:    03/26/21 1311   BP: 114/64   Pulse: 74     Body mass index is 21.61 kg/m². Review of Systems   Constitutional: Negative. HENT: Negative. Eyes: Negative. Respiratory: Negative. Cardiovascular: Negative. Gastrointestinal: Negative. Genitourinary: Negative for difficulty urinating, dyspareunia, dysuria, enuresis, frequency, hematuria, menstrual problem, pelvic pain, urgency and vaginal discharge. Musculoskeletal: Negative. Skin: Negative. Neurological: Negative. Psychiatric/Behavioral: Negative. Physical Exam  Vitals signs and nursing note reviewed. Constitutional:       General: She is not in acute distress. Appearance: She is well-developed. She is not diaphoretic. HENT:      Head: Normocephalic and atraumatic. Right Ear: External ear normal.      Left Ear: External ear normal.      Nose: Nose normal.   Eyes:      General: Lids are normal.         Right eye: No discharge. Left eye: No discharge. Conjunctiva/sclera: Conjunctivae normal.      Pupils: Pupils are equal, round, and reactive to light. Neck:      Musculoskeletal: Normal range of motion and neck supple. Thyroid: No thyroid mass or thyromegaly. Trachea: No tracheal deviation. Cardiovascular:      Rate and Rhythm: Normal rate and regular rhythm. Heart sounds: Normal heart sounds. No murmur. Pulmonary:      Effort: Pulmonary effort is normal.      Breath sounds: Normal breath sounds. No wheezing. Chest:      Breasts: Breasts are symmetrical.         Right: No inverted nipple, mass, nipple discharge, skin change or tenderness. Left: No inverted nipple, mass, nipple discharge, skin change or tenderness. Abdominal:      General: Bowel sounds are normal. There is no distension. Palpations: Abdomen is soft.  There is no mass. Tenderness: There is no abdominal tenderness. Hernia: There is no hernia in the left inguinal area. Genitourinary:     Labia:         Right: No rash, tenderness or lesion. Left: No rash, tenderness or lesion. Vagina: No vaginal discharge or tenderness. Cervix: No cervical motion tenderness or discharge (normal cervical mucosa). Uterus: Not enlarged and not tender. Adnexa:         Right: No mass, tenderness or fullness. Left: No mass, tenderness or fullness. Rectum: No external hemorrhoid. Comments: Pap collected for cervical cytology; grade 2 rectocele noted  Musculoskeletal: Normal range of motion. General: No tenderness. Lymphadenopathy:      Cervical:      Right cervical: No superficial, deep or posterior cervical adenopathy. Left cervical: No superficial, deep or posterior cervical adenopathy. Upper Body:      Right upper body: No supraclavicular, pectoral or epitrochlear adenopathy. Left upper body: No supraclavicular, pectoral or epitrochlear adenopathy. Skin:     General: Skin is warm and dry. Findings: No rash. Neurological:      Mental Status: She is alert and oriented to person, place, and time. She is not disoriented. Sensory: No sensory deficit. Coordination: Coordination normal.      Gait: Gait normal.      Deep Tendon Reflexes: Reflexes are normal and symmetric. Psychiatric:         Speech: Speech normal.         Behavior: Behavior normal.         Thought Content: Thought content normal.         Judgment: Judgment normal.          Diagnosis Orders   1. Women's annual routine gynecological examination  PAP SMEAR    Human papillomavirus (HPV) DNA probe thin prep high risk   2. Screening for cervical cancer  PAP SMEAR    Human papillomavirus (HPV) DNA probe thin prep high risk   3. Screening for HPV (human papillomavirus)     4.  Encounter for screening mammogram for malignant neoplasm of breast  [facility] DIGITAL SCREEN W OR WO CAD BILATERAL   5. Rectocele         MEDICATIONS:  No orders of the defined types were placed in this encounter. ORDERS:  Orders Placed This Encounter   Procedures    [facility]LEY DIGITAL SCREEN W OR WO CAD BILATERAL    PAP SMEAR    Human papillomavirus (HPV) DNA probe thin prep high risk       PLAN:  Pap collected  Mammogram ordered  Discussed keeping bowels regular and to call if issues since rectocele noted today. There are no Patient Instructions on file for this visit.

## 2021-05-11 NOTE — OP NOTE
Patient: Nataliya De Jesus : 1965  Med Rec#: [id number] Acc#: [id number]   Primary Care Provider Zara Webber MD    Date of Procedure:  2/10/2020    Endoscopist: Mecca Wahl MD    Referring Provider: Zara Webber MD    Operation Performed: Colonoscopy     Indications: screening/hx of polyps    Anesthesia:  Sedation was administered by anesthesia who monitored the patient during the procedure. I met with Nataliya De Jesus prior to procedure. We discussed the procedure itself, and I have discussed the risks of endoscopy (including-- but not limited to-- pain, discomfort, bleeding potentially requiring second endoscopic procedure and/or blood transfusion, organ perforation requiring operative repair, damage to organs near the colon, infection, aspiration, cardiopulmonary/allergic reaction), benefits, indications to endoscopy. Additionally, we discussed options other than colonoscopy. The patient expressed understanding. All questions answered. The patient decided to proceed with the procedure. Signed informed consent was placed on the chart. Blood Loss: minimal    Withdrawal time: > 6 min  Bowel Prep: adequate     Complications: no immediate complications    DESCRIPTION OF PROCEDURE:     A time out was performed. After written informed consent was obtained, the patient was placed in the left lateral position. The perianal area was inspected, and a digital rectal exam was performed. A rectal exam was performed: normal tone, no palpable lesions. At this point, a forward viewing Olympus colonoscope was inserted into the anus and carefully advanced to the cecum. The cecum was identified by the ileocecal valve and the appendiceal orifice. The colonoscope was then slowly withdrawn with careful inspection of the mucosa in a linear and circumferential fashion. The scope was retroflexed in the rectum. Suction was utilized during the procedure to remove as much air as possible from the bowel.  The colonoscope
[FreeTextEntry3] : All medical record entries made by the Scribe were at my, Dr. Rowley's, discretion and personally dictated by me on 05/11/2021. I have reviewed the chart and agree that the record accurately reflects my personal performance of the history, physical exam, assessment and plan. I have also personally directed, reviewed and agreed to the chart.

## 2021-05-19 ENCOUNTER — TELEPHONE (OUTPATIENT)
Dept: SURGERY | Age: 56
End: 2021-05-19

## 2021-06-11 ENCOUNTER — HOSPITAL ENCOUNTER (OUTPATIENT)
Dept: WOMENS IMAGING | Age: 56
Discharge: HOME | End: 2021-06-11
Payer: COMMERCIAL

## 2021-06-11 DIAGNOSIS — M85.80 OSTEOPENIA, UNSPECIFIED LOCATION: ICD-10-CM

## 2021-06-11 DIAGNOSIS — Z12.31 ENCOUNTER FOR SCREENING MAMMOGRAM FOR MALIGNANT NEOPLASM OF BREAST: ICD-10-CM

## 2021-06-11 PROCEDURE — 77080 DXA BONE DENSITY AXIAL: CPT

## 2021-06-11 PROCEDURE — 77063 BREAST TOMOSYNTHESIS BI: CPT

## 2021-06-16 ENCOUNTER — OFFICE VISIT (OUTPATIENT)
Dept: SURGERY | Age: 56
End: 2021-06-16
Payer: COMMERCIAL

## 2021-06-16 VITALS
DIASTOLIC BLOOD PRESSURE: 78 MMHG | HEIGHT: 67 IN | WEIGHT: 135 LBS | SYSTOLIC BLOOD PRESSURE: 109 MMHG | TEMPERATURE: 97.8 F | BODY MASS INDEX: 21.19 KG/M2

## 2021-06-16 DIAGNOSIS — Z12.31 VISIT FOR SCREENING MAMMOGRAM: Primary | ICD-10-CM

## 2021-06-16 PROCEDURE — 99213 OFFICE O/P EST LOW 20 MIN: CPT | Performed by: PHYSICIAN ASSISTANT

## 2021-06-24 ENCOUNTER — VIRTUAL VISIT (OUTPATIENT)
Dept: PRIMARY CARE CLINIC | Age: 56
End: 2021-06-24
Payer: COMMERCIAL

## 2021-06-24 DIAGNOSIS — M81.0 OSTEOPOROSIS OF MULTIPLE SITES: ICD-10-CM

## 2021-06-24 DIAGNOSIS — E55.9 VITAMIN D DEFICIENCY: ICD-10-CM

## 2021-06-24 DIAGNOSIS — M81.0 OSTEOPOROSIS OF MULTIPLE SITES: Primary | ICD-10-CM

## 2021-06-24 LAB
CHOLESTEROL, TOTAL: 198 MG/DL (ref 160–199)
GLUCOSE BLD-MCNC: 58 MG/DL (ref 74–109)
HDLC SERPL-MCNC: 79 MG/DL (ref 65–121)
LDL CHOLESTEROL CALCULATED: 105 MG/DL
TRIGL SERPL-MCNC: 68 MG/DL (ref 0–149)

## 2021-06-24 PROCEDURE — 99214 OFFICE O/P EST MOD 30 MIN: CPT | Performed by: FAMILY MEDICINE

## 2021-06-24 RX ORDER — ALENDRONATE SODIUM 70 MG/1
70 TABLET ORAL
Qty: 12 TABLET | Refills: 5 | Status: SHIPPED | OUTPATIENT
Start: 2021-06-24 | End: 2021-11-16

## 2021-06-24 NOTE — PROGRESS NOTES
2021    TELEHEALTH EVALUATION -- Audio/Visual (During BYI-96 public health emergency)    HPI:    Lindsay Ma (:  1965) has requested an audio/video evaluation for the following concern(s):    Patient presents today via video visit for f/u abnl DXA scan. Eats well, exercises, and on Ca/VitD. She notes she has had a low vitD about 7 yrs ago. T score decreased -1.8 to -2.8. She is worried that she has a history of  vitaD def. Taking Ca/vitD. She notes that her mom has a history of osteoporosis. She is worried that she is taking action including low weightbearing exercise try to improve her bone density but notes that it is decline. She did have a history of vitamin D deficiency as noted above and has been taking a supplement to try to help with this. Review of Systems   Constitutional: Negative for chills and fever. Respiratory: Negative for cough, chest tightness, shortness of breath and wheezing. Cardiovascular: Negative for chest pain, palpitations and leg swelling. Gastrointestinal: Negative for abdominal pain, constipation, diarrhea, nausea and vomiting. Genitourinary: Negative for difficulty urinating, dysuria and frequency. Prior to Visit Medications    Medication Sig Taking? Authorizing Provider   alendronate (FOSAMAX) 70 MG tablet Take 1 tablet by mouth every 7 days Yes Jasmin Russell MD   Cholecalciferol (VITAMIN D) 2000 units CAPS capsule Take by mouth  Historical Provider, MD   metaxalone (SKELAXIN) 800 MG tablet Take 1 tablet by mouth 3 times daily  Patient not taking: Reported on 2021  Jasmin Russell MD   Calcium Carb-Cholecalciferol (CALCIUM-VITAMIN D) 500-400 MG-UNIT TABS 2 tabs daily  Jasmin Russell MD   Multiple Vitamins-Minerals (MULTIVITAL PO) Take  by mouth.   Historical Provider, MD       Social History     Tobacco Use    Smoking status: Never Smoker    Smokeless tobacco: Never Used   Vaping Use    Vaping Use: Never used   Substance Use Topics  Alcohol use: Yes     Comment: occasionally    Drug use: No        No Known Allergies,   Past Medical History:   Diagnosis Date    Thyroid nodule    ,   Past Surgical History:   Procedure Laterality Date    BREAST CYST ASPIRATION Right 2007    BREAST CYST ASPIRATION  2016    BREAST CYST ASPIRATION  2015    COLONOSCOPY N/A 2/10/2020    Dr Maki Abdalla, 5 yr recall    DILATION AND CURETTAGE OF UTERUS  1991    [place] COLONOSCOPY FLX DX W/COLLJ [address] [place] [address] WHEN PFRMD N/A 12/8/2016    Dr Lin Ortiz bleeding small to moderated sized internal hemorrhoids, tubulovillous AP, (- ) dysplasia--3 yr recall    [address] 2016   ,   Social History     Tobacco Use    Smoking status: Never Smoker    Smokeless tobacco: Never Used   Vaping Use    Vaping Use: Never used   Substance Use Topics    Alcohol use: Yes     Comment: occasionally    Drug use: No   ,   Family History   Problem Relation Age of Onset    Heart Disease Paternal Grandfather     High Cholesterol Paternal Grandmother     Breast Cancer Maternal Grandmother [de-identified]    High Blood Pressure Father     High Cholesterol Father     Prostate Cancer Father     High Blood Pressure Mother     High Cholesterol Mother     Immunodeficiency Mother         low IGG   ,   Immunization History   Administered Date(s) Administered    COVID-19, Pfizer, PF, 30mcg/0.3mL 12/17/2020, 01/08/2021    DTaP 11/06/2008    Influenza Vaccine, unspecified formulation 10/27/2016    Influenza Virus Vaccine 10/10/2019, 10/22/2020    Influenza, Quadv, IM, (6 mo and older Fluzone, Flulaval, Fluarix and 3 yrs and older Afluria) 10/18/2018    Influenza, Quadv, IM, PF (6 mo and older Fluzone, Flulaval, Fluarix, and 3 yrs and older Afluria) 10/06/2017    Influenza, Quadv, Recombinant, IM PF (Flublok 18 yrs and older) 10/01/2019    Tdap (Boostrix, Adacel) 01/01/2008, 11/06/2018    Zoster Recombinant (Shingrix) 01/01/2020, 09/01/2020   ,   Health Maintenance   Topic Psychiatric:       [x] Normal Affect [x] No Hallucinations        [] Abnormal-     Other pertinent observable physical exam findings-     ASSESSMENT/PLAN:    ICD-10-CM    1. Osteoporosis of multiple sites  M81.0 CANCELED: Vitamin D 25 Hydroxy   2. Vitamin D deficiency  E55.9        Reviewed patient's laboratory with patient. She does have new diagnosis of osteoporosis. I do believe it is in her best interest to go ahead and start the medication as she is very high risk based on her thin body habitus for osteoporosis. Patient is also have vitamin D work-up as well based on history of vitamin D deficiency    Orders Placed This Encounter   Medications    alendronate (FOSAMAX) 70 MG tablet     Sig: Take 1 tablet by mouth every 7 days     Dispense:  12 tablet     Refill:  5       No orders of the defined types were placed in this encounter. No follow-ups on file. Roman Kellogg is a 48 y.o. female being evaluated by a Virtual Visit (video visit) encounter to address concerns as mentioned above. A caregiver was present when appropriate. Due to this being a TeleHealth encounter (During [facility]- public health emergency), evaluation of the following organ systems was limited: Vitals/Constitutional/EENT/Resp/CV/GI//MS/Neuro/Skin/Heme-Lymph-Imm. Pursuant to the emergency declaration under the [address] [place] [address] authority and the [employer] and [employer] Act, this Virtual Visit was conducted with patient's (and/or legal guardian's) consent, to reduce the patient's risk of exposure to COVID-19 and provide necessary medical care. The patient (and/or legal guardian) has also been advised to contact this office for worsening conditions or problems, and seek emergency medical treatment and/or call 911 if deemed necessary. Services were provided through a video synchronous discussion virtually to substitute for in-person clinic visit.

## 2021-06-25 NOTE — PROGRESS NOTES
HPI:  Fabian Pod is in for yearly follow-up breast check. She has not noticed any changes in her breasts. Her imaging was reviewed and is noted below. [facility] DIGITAL SCREEN W OR WO CAD BILATERAL 6/11/2021 12:23 PM   HISTORY: Z12.31     COMPARISON STUDIES: Exams dated 6/2/2020, 4/26/2019, 4/25/2018,   4/26/2017, 4/24/2017, 4/19/2016. FINDINGS:    2-D and 3-D digital mammography of bilateral breasts was obtained in   the CC and MLO projection. The breast tissue is heterogeneously dense (approximately 50-75%   glandular tissue), limiting the sensitivity of mammography. There are   scattered benign calcifications, mostly on the left. Right breast mole   markers. No suspicious masses, architectural distortion or new   suspicious microcalcifications are identified. There has been no   significant change. This study was interpreted with CAD. IMPRESSION AND RECOMMENDATION:    No mammographic evidence of malignancy. Recommendation is for the   patient to return for routine mammography in one year or sooner, if   clinically indicated. BI-RADS CATEGORY 2: BENIGN FINDINGS        BREAST EXAM:  On examination, she has fibrocystic changes throughout both breasts, no dominant masses, no skin or nipple changes and no axillary adenopathy. I see nothing suspicious for breast cancer. ASSESSMENT:  Benign fibrocystic changes              PLAN:  I will plan to see her back in 1 year for physical exam and bilateral mammograms. She will contact me if anything significant changes. 20 minutes spent, which includes face to face with patient, record review, evaluation, planning, and education.

## 2021-06-30 ENCOUNTER — PATIENT MESSAGE (OUTPATIENT)
Dept: PRIMARY CARE CLINIC | Age: 56
End: 2021-06-30

## 2021-06-30 DIAGNOSIS — E55.9 VITAMIN D DEFICIENCY: Primary | ICD-10-CM

## 2021-06-30 ASSESSMENT — ENCOUNTER SYMPTOMS
DIARRHEA: 0
CHEST TIGHTNESS: 0
WHEEZING: 0
VOMITING: 0
NAUSEA: 0
COUGH: 0
SHORTNESS OF BREATH: 0
CONSTIPATION: 0
ABDOMINAL PAIN: 0

## 2021-06-30 NOTE — TELEPHONE ENCOUNTER
Orders Placed This Encounter   Procedures    Vitamin D 25 Hydroxy     Standing Status:   Future     Standing Expiration Date:   6/30/2022

## 2021-07-01 DIAGNOSIS — E55.9 VITAMIN D DEFICIENCY: Primary | ICD-10-CM

## 2021-11-16 ENCOUNTER — OFFICE VISIT (OUTPATIENT)
Dept: PRIMARY CARE CLINIC | Age: 56
End: 2021-11-16
Payer: COMMERCIAL

## 2021-11-16 VITALS
OXYGEN SATURATION: 98 % | DIASTOLIC BLOOD PRESSURE: 68 MMHG | BODY MASS INDEX: 20.72 KG/M2 | WEIGHT: 132 LBS | HEIGHT: 67 IN | HEART RATE: 71 BPM | TEMPERATURE: 97.6 F | SYSTOLIC BLOOD PRESSURE: 105 MMHG

## 2021-11-16 DIAGNOSIS — Z00.00 ROUTINE GENERAL MEDICAL EXAMINATION AT A HEALTH CARE FACILITY: Primary | ICD-10-CM

## 2021-11-16 DIAGNOSIS — E55.9 VITAMIN D DEFICIENCY: ICD-10-CM

## 2021-11-16 DIAGNOSIS — Z00.00 ENCOUNTER FOR WELL ADULT EXAM WITHOUT ABNORMAL FINDINGS: ICD-10-CM

## 2021-11-16 DIAGNOSIS — M81.0 OSTEOPOROSIS OF MULTIPLE SITES: ICD-10-CM

## 2021-11-16 DIAGNOSIS — M89.8X9 BONE PAIN: ICD-10-CM

## 2021-11-16 PROCEDURE — 99396 PREV VISIT EST AGE 40-64: CPT | Performed by: FAMILY MEDICINE

## 2021-11-16 ASSESSMENT — PATIENT HEALTH QUESTIONNAIRE - PHQ9
SUM OF ALL RESPONSES TO PHQ QUESTIONS 1-9: 0
1. LITTLE INTEREST OR PLEASURE IN DOING THINGS: 0
2. FEELING DOWN, DEPRESSED OR HOPELESS: 0
SUM OF ALL RESPONSES TO PHQ9 QUESTIONS 1 & 2: 0
SUM OF ALL RESPONSES TO PHQ QUESTIONS 1-9: 0
SUM OF ALL RESPONSES TO PHQ QUESTIONS 1-9: 0

## 2021-11-16 ASSESSMENT — ENCOUNTER SYMPTOMS
EYE ITCHING: 0
COLOR CHANGE: 0
COUGH: 0
RHINORRHEA: 0
NAUSEA: 0
ABDOMINAL PAIN: 0
SORE THROAT: 0
SHORTNESS OF BREATH: 0

## 2021-11-16 NOTE — PROGRESS NOTES
Reyna Gorman is a 55 y.o. female who presents today for   Chief Complaint   Patient presents with    Annual Exam    Discuss Medications     side effects of fosamax       HPI   Patient is here for 2 separate visits: annual wellness visit as well as acute and chronic issues. The below review of systems and physical exam applies to both encounters. Annual HPI:  Pt here for annual.  Up-to-date on COVID vaccine x3 as well as influenza vaccine. Notes otherwise doing quite well besides some acute issues recently. Strong family history of bone fracture. No other family medical history changes. Negative screenings for Pap smear and mammogram recently    Acute/Chronic HPI:  Patient is here for concern about the pain related to osteoporosis. Believes it is from her Fosamax. . Having bone pain from the bisphosphonates. She is having pain throughout the day. Notes pain at the L distal femoral. She is doing well overall. Has a history of recurrent IT band syndrome with fibular head pain. No change in PMH, family, social, or surgical history unless mentioned above. I have reviewed the above chief complaint and HPI details charted by staff and claim ownership of the documentation. Review of Systems   Constitutional: Negative for chills and fever. HENT: Negative for rhinorrhea and sore throat. Eyes: Negative for itching and visual disturbance. Respiratory: Negative for cough and shortness of breath. Cardiovascular: Negative for chest pain and palpitations. Gastrointestinal: Negative for abdominal pain and nausea. Endocrine: Negative for polydipsia and polyuria. Genitourinary: Negative for dysuria and frequency. Musculoskeletal: Negative for arthralgias and myalgias. Skin: Negative for color change and rash. Allergic/Immunologic: Negative for environmental allergies and food allergies. Neurological: Negative for dizziness and light-headedness. Hematological: Negative for adenopathy. Does not bruise/bleed easily. Psychiatric/Behavioral: Negative for dysphoric mood. The patient is not nervous/anxious. Past Medical History:   Diagnosis Date    Thyroid nodule        Current Outpatient Medications   Medication Sig Dispense Refill    Cholecalciferol (VITAMIN D) 2000 units CAPS capsule Take by mouth      Calcium Carb-Cholecalciferol (CALCIUM-VITAMIN D) 500-400 MG-UNIT TABS 2 tabs daily 180 tablet 5    Multiple Vitamins-Minerals (MULTIVITAL PO) Take  by mouth.  metaxalone (SKELAXIN) 800 MG tablet Take 1 tablet by mouth 3 times daily (Patient not taking: Reported on 11/16/2021) 90 tablet 2     No current facility-administered medications for this visit.        No Known Allergies    Past Surgical History:   Procedure Laterality Date    BREAST CYST ASPIRATION Right 2007    BREAST CYST ASPIRATION  2016    BREAST CYST ASPIRATION  2015    COLONOSCOPY N/A 2/10/2020    Dr Nery Wright, 5 yr recall    DILATION AND CURETTAGE OF UTERUS  1991    [place] COLONOSCOPY FLX DX W/COLLJ [address] WHEN PFRMD N/A 12/8/2016    Dr Kirby Maynard bleeding small to moderated sized internal hemorrhoids, tubulovillous AP, (- ) dysplasia--3 yr recall    US BREAST FINE NEEDLE ASPIRATION Left 2016       Social History     Tobacco Use    Smoking status: Never Smoker    Smokeless tobacco: Never Used   Vaping Use    Vaping Use: Never used   Substance Use Topics    Alcohol use: Yes     Comment: occasionally    Drug use: No       Family History   Problem Relation Age of Onset    Heart Disease Paternal Grandfather     High Cholesterol Paternal Grandmother     Breast Cancer Maternal Grandmother [de-identified]    High Blood Pressure Father     High Cholesterol Father     Prostate Cancer Father     High Blood Pressure Mother     High Cholesterol Mother     Immunodeficiency Mother         low IGG       /68 (Site: Left Upper Arm, Position: Sitting)   Pulse 71   Temp 97.6 °F (36.4 °C)   Ht 5' 7\" (1.702 m)   Wt 132 lb (59.9 kg)   LMP 11/01/2017 (Exact Date)   SpO2 98%   BMI 20.67 kg/m²     Physical Exam  Vitals and nursing note reviewed. Constitutional:       General: She is not in acute distress. Appearance: She is well-developed. She is not toxic-appearing or diaphoretic. HENT:      Head: Normocephalic and atraumatic. Not macrocephalic and not microcephalic. Right Ear: External ear normal. No decreased hearing noted. No drainage. Left Ear: External ear normal. No decreased hearing noted. No drainage. Nose: Nose normal. No nasal deformity or rhinorrhea. Mouth/Throat:      Mouth: Mucous membranes are not pale and not dry. Pharynx: Uvula midline. Eyes:      General: Lids are normal. No scleral icterus. Right eye: No discharge. Left eye: No discharge. Conjunctiva/sclera: Conjunctivae normal.      Pupils: Pupils are equal, round, and reactive to light. Neck:      Thyroid: No thyromegaly. Trachea: Phonation normal. No tracheal deviation. Cardiovascular:      Rate and Rhythm: Normal rate and regular rhythm. No extrasystoles are present. Heart sounds: Normal heart sounds, S1 normal and S2 normal. No murmur heard. Pulmonary:      Effort: Pulmonary effort is normal. No respiratory distress. Breath sounds: Normal breath sounds. No wheezing, rhonchi or rales. Chest:   Breasts:      Right: No supraclavicular adenopathy. Left: No supraclavicular adenopathy. Abdominal:      General: Bowel sounds are normal. There is no distension. Palpations: Abdomen is soft. Tenderness: There is no abdominal tenderness. There is no guarding. Musculoskeletal:         General: No tenderness. Normal range of motion. Cervical back: Normal range of motion and neck supple. No tenderness or bony tenderness. Thoracic back: Normal. No tenderness or bony tenderness. Lumbar back: Normal. No tenderness or bony tenderness.       Right lower leg: No swelling. No edema. Left lower leg: No swelling. No edema. Lymphadenopathy:      Head:      Right side of head: No submental, submandibular or tonsillar adenopathy. Left side of head: No submental, submandibular or tonsillar adenopathy. Cervical: No cervical adenopathy. Upper Body:      Right upper body: No supraclavicular adenopathy. Left upper body: No supraclavicular adenopathy. Skin:     General: Skin is dry. Coloration: Skin is not pale. Findings: No erythema (on head, neck, face, extremities) or rash (on extremities, head, neck, face). Nails: There is no clubbing. Neurological:      Mental Status: She is alert and oriented to person, place, and time. Motor: No tremor or seizure activity. Gait: Gait normal.      Deep Tendon Reflexes: Reflexes are normal and symmetric. Psychiatric:         Speech: Speech normal.         Behavior: Behavior normal.         Thought Content: Thought content normal.         Judgment: Judgment normal.         Assessment:    ICD-10-CM    1. Routine general medical examination at a health care facility  Z00.00    2. Bone pain  M89.8X9 Magnesium   3. Vitamin D deficiency  E55.9 Vitamin D 25 Hydroxy     Magnesium   4. Osteoporosis of multiple sites  M81.0 Vitamin D 25 Hydroxy     CBC     Comprehensive Metabolic Panel   5. Encounter for well adult exam without abnormal findings  Z00.00        Plan:   Plan #1: Annual exam  I have reviewed and assessed the patient's current health status, risk factors, and progress for available preventative care. Plan #2: Acute and chronic conditions  She has a strong FMH of osteoporosis. Pain likely secondary to higher regional activity of osteoblastic and osteoclastic activity at an area of high tendon tension which is likely near the fibular head from the IT band syndrome history. If not improving, we will do x-ray.   Do not believe it is osteonecrosis at this time based on lack of other findings. Orders Placed This Encounter   Procedures    Vitamin D 25 Hydroxy     Standing Status:   Future     Standing Expiration Date:   11/16/2022    CBC     Standing Status:   Future     Standing Expiration Date:   11/16/2022    Comprehensive Metabolic Panel     Standing Status:   Future     Standing Expiration Date:   11/16/2022    Magnesium     Standing Status:   Future     Standing Expiration Date:   11/16/2022     No orders of the defined types were placed in this encounter. Medications Discontinued During This Encounter   Medication Reason    alendronate (FOSAMAX) 70 MG tablet LIST CLEANUP     Patient Instructions   Get your labs checked in [address] of [address] building. Patient given educational handouts and has had all questions answered. Patient voices understanding and agrees to plans along with risks and benefits of plan. Patient isinstructed to continue prior meds, diet, and exercise plans unless instructed otherwise. Patient agrees to follow up as instructed and sooner if needed. Patient agrees to go to ER if condition becomes emergent. Notesmay be completed with dictation device and spelling errors may occur. Materials may be copied and pasted from a notepad outside of EMR, all of which, I, Dr. Mario Andrew MD, take sole intellectual ownership of and have approved adding to my note. Return in about 1 year (around 11/16/2022) for ov, annual 2 time slots.

## 2021-11-18 DIAGNOSIS — M89.8X9 BONE PAIN: ICD-10-CM

## 2021-11-18 DIAGNOSIS — M81.0 OSTEOPOROSIS OF MULTIPLE SITES: ICD-10-CM

## 2021-11-18 DIAGNOSIS — E55.9 VITAMIN D DEFICIENCY: ICD-10-CM

## 2021-11-18 LAB
ALBUMIN SERPL-MCNC: 4.8 G/DL (ref 3.5–5.2)
ALP BLD-CCNC: 67 U/L (ref 35–104)
ALT SERPL-CCNC: 17 U/L (ref 5–33)
ANION GAP SERPL CALCULATED.3IONS-SCNC: 11 MMOL/L (ref 7–19)
AST SERPL-CCNC: 20 U/L (ref 5–32)
BILIRUB SERPL-MCNC: 0.4 MG/DL (ref 0.2–1.2)
BUN BLDV-MCNC: 12 MG/DL (ref 6–20)
CALCIUM SERPL-MCNC: 10 MG/DL (ref 8.6–10)
CHLORIDE BLD-SCNC: 103 MMOL/L (ref 98–111)
CO2: 28 MMOL/L (ref 22–29)
CREAT SERPL-MCNC: 0.6 MG/DL (ref 0.5–0.9)
GFR AFRICAN AMERICAN: >59
GFR NON-AFRICAN AMERICAN: >60
GLUCOSE BLD-MCNC: 95 MG/DL (ref 74–109)
HCT VFR BLD CALC: 47.3 % (ref 37–47)
HEMOGLOBIN: 15.4 G/DL (ref 12–16)
MAGNESIUM: 2.5 MG/DL (ref 1.6–2.6)
MCH RBC QN AUTO: 30.1 PG (ref 27–31)
MCHC RBC AUTO-ENTMCNC: 32.6 G/DL (ref 33–37)
MCV RBC AUTO: 92.6 FL (ref 81–99)
PDW BLD-RTO: 12 % (ref 11.5–14.5)
PLATELET # BLD: 358 K/UL (ref 130–400)
PMV BLD AUTO: 9.4 FL (ref 9.4–12.3)
POTASSIUM SERPL-SCNC: 3.8 MMOL/L (ref 3.5–5)
RBC # BLD: 5.11 M/UL (ref 4.2–5.4)
SODIUM BLD-SCNC: 142 MMOL/L (ref 136–145)
TOTAL PROTEIN: 8.1 G/DL (ref 6.6–8.7)
VITAMIN D 25-HYDROXY: 68.1 NG/ML
WBC # BLD: 6.4 K/UL (ref 4.8–10.8)

## 2022-01-21 ENCOUNTER — OFFICE VISIT (OUTPATIENT)
Dept: ENT CLINIC | Age: 57
End: 2022-01-21
Payer: COMMERCIAL

## 2022-01-21 VITALS
BODY MASS INDEX: 20.09 KG/M2 | SYSTOLIC BLOOD PRESSURE: 104 MMHG | HEIGHT: 67 IN | WEIGHT: 128 LBS | DIASTOLIC BLOOD PRESSURE: 64 MMHG

## 2022-01-21 DIAGNOSIS — R04.0 EPISTAXIS: ICD-10-CM

## 2022-01-21 DIAGNOSIS — H61.23 BILATERAL IMPACTED CERUMEN: Primary | ICD-10-CM

## 2022-01-21 PROCEDURE — 99214 OFFICE O/P EST MOD 30 MIN: CPT | Performed by: PHYSICIAN ASSISTANT

## 2022-01-21 PROCEDURE — 92511 NASOPHARYNGOSCOPY: CPT | Performed by: PHYSICIAN ASSISTANT

## 2022-01-21 PROCEDURE — 69210 REMOVE IMPACTED EAR WAX UNI: CPT | Performed by: PHYSICIAN ASSISTANT

## 2022-01-21 RX ORDER — OXYMETAZOLINE HYDROCHLORIDE 0.05 G/100ML
3 SPRAY NASAL PRN
Qty: 14 ML | Refills: 1 | Status: SHIPPED | OUTPATIENT
Start: 2022-01-21 | End: 2022-02-20

## 2022-01-21 RX ORDER — SODIUM CHLORIDE/ALOE VERA
GEL (GRAM) NASAL
Qty: 14 G | Refills: 3 | Status: SHIPPED | OUTPATIENT
Start: 2022-01-21 | End: 2022-04-01

## 2022-01-21 ASSESSMENT — ENCOUNTER SYMPTOMS
FACIAL SWELLING: 0
TROUBLE SWALLOWING: 0
EYE PAIN: 0
SORE THROAT: 0
VOICE CHANGE: 0
EYE DISCHARGE: 0
RHINORRHEA: 0
SINUS PAIN: 0
SINUS PRESSURE: 0

## 2022-01-21 NOTE — PROGRESS NOTES
Canelo Ayon is a pleasant 73-year-old  female that was referred by Dr. Tamika Emerson due to problems with epistaxis and also issues with decreased hearing. The patient reports that she was working earlier this week and was noted to have acute nasal bleeding from the right nare. She reported that after pressure for 15 minutes, this eventually resolved. She admits to having recurrent nasal bleeds off and on for about a year and a half. She denies any history of nasal trauma. She does admit to using Ocean nasal spray sporadically. Stephen Russo also reports that she has noticed diminished hearing and has a history of recurrent cerumen impactions. Allergies: Patient has no known allergies. Current Outpatient Medications   Medication Sig Dispense Refill    saline nasal gel (AYR) GEL Applied to the nares twice a day with a Q-tip 14 g 3    oxymetazoline (AFRIN) 0.05 % nasal spray 3 sprays by Nasal route as needed (bleeding) 14 mL 1    Cholecalciferol (VITAMIN D) 2000 units CAPS capsule Take by mouth      metaxalone (SKELAXIN) 800 MG tablet Take 1 tablet by mouth 3 times daily 90 tablet 2    Calcium Carb-Cholecalciferol (CALCIUM-VITAMIN D) 500-400 MG-UNIT TABS 2 tabs daily 180 tablet 5    Multiple Vitamins-Minerals (MULTIVITAL PO) Take  by mouth. No current facility-administered medications for this visit.        Past Surgical History:   Procedure Laterality Date    BREAST CYST ASPIRATION Right 2007    BREAST CYST ASPIRATION  2016    BREAST CYST ASPIRATION  2015    COLONOSCOPY N/A 2/10/2020    Dr Ashu Marcus, 5 yr recall    DILATION AND CURETTAGE OF UTERUS  1991    [place] COLONOSCOPY FLX DX W/COLLJ [address] [place] [address] WHEN PFRMD N/A 12/8/2016    Dr Shannon Back bleeding small to moderated sized internal hemorrhoids, tubulovillous AP, (- ) dysplasia--3 yr recall   [address] [facility] Left 2016       Past Medical History:   Diagnosis Date    Nosebleed     Thyroid nodule        Family History   Problem Relation Age of Onset    Heart Disease Paternal Grandfather     High Cholesterol Paternal Grandmother     Breast Cancer Maternal Grandmother [de-identified]    High Blood Pressure Father     High Cholesterol Father     Prostate Cancer Father     High Blood Pressure Mother     High Cholesterol Mother     Immunodeficiency Mother         low IGG       Social History     Tobacco Use    Smoking status: Never Smoker    Smokeless tobacco: Never Used   Substance Use Topics    Alcohol use: Yes     Comment: occasionally           REVIEW OF SYSTEMS:  all other systems reviewed and are negative  Review of Systems   Constitutional: Negative for chills and fever. HENT: Positive for nosebleeds. Negative for congestion, dental problem, ear discharge, ear pain, facial swelling, hearing loss, postnasal drip, rhinorrhea, sinus pressure, sinus pain, sneezing, sore throat, tinnitus, trouble swallowing and voice change. Eyes: Negative for pain and discharge. Neurological: Negative for dizziness and headaches. Comments:     PHYSICAL EXAM:    /64   Ht 5' 7\" (1.702 m)   Wt 128 lb (58.1 kg)   LMP 11/01/2017 (Exact Date)   BMI 20.05 kg/m²   Body mass index is 20.05 kg/m². General Appearance: well developed  and well nourished  Head/ Face: normocephalic and atraumatic  Vocal Quality: good/ normal  Ears: Right Ear: External: external ears normal Otoscopy Ear Canal: cerumen impaction Otoscopy TM: TM's normal and TM's mobile Left Ear: External: external ears normal Otoscopy Ear Canal: cerumen impaction Otoscopy TM: TM's normal and TM's mobile  Hearing: grossly intact  Nose: see endoscopy report  Neck: supple and adenopathy none palpable  Thyroid: normal    Assessment & Plan:    Problem List Items Addressed This Visit     Epistaxis     History of epistaxis with negative physical exam for etiology  Plan: I advised the patient to treat the nares with saline nasal gel twice a day. She was also prescribed Afrin nasal spray to be used if she develops recurrent bleeding. Mee Reveles was advised to call if she has recurring problems for reevaluation. Relevant Orders    [place] Nasopharyngoscopy (Completed)    Bilateral impacted cerumen - Primary     Bilateral cerumen impaction-will remove today with microscopic guidance  Plan: Due to the patient having recurrent cerumen impactions in the past, I have recommended the patient to see me every 6 months for a cerumen check. Relevant Orders    92567 - [place] REMOVE IMPACTED EAR WAX (Completed)          Orders Placed This Encounter   Procedures    30586 - [place] REMOVE IMPACTED EAR WAX     With microscopic guidance, the cerumen impaction was removed with suction and alligator graspers without any complications. Copious amounts of cerumen was successfully removed. The TMs appear to be normal with no evidence of perforation or infection. Patient was noted to have somewhat of a stenotic canal to the right side.  [place] Nasopharyngoscopy     The nares were anesthetized with 4% lidocaine aerosol bilaterally. Each nare was individually evaluated where the patient was found to have engorged turbinates with no evidence of a septal ulceration or any source of bleeding. The scope was advanced to the posterior nasopharyngeal region where no polyps were encountered and again no ulcerations were noted. The oropharyngeal region demonstrated normal swallowing with some mild erythema to the posterior pharynx. No masses were noted. Overall no gross abnormalities were appreciated as the source of the nasal bleed.        Orders Placed This Encounter   Medications    saline nasal gel (AYR) GEL     Sig: Applied to the nares twice a day with a Q-tip     Dispense:  14 g     Refill:  3    oxymetazoline (AFRIN) 0.05 % nasal spray     Sig: 3 sprays by Nasal route as needed (bleeding)     Dispense:  14 mL     Refill:  1       Electronically signed by Lokesh Blankenship ALLIE on 1/21/22 at 12:31 PM CST          Please note that this chart was generated using dragon dictation software. Although every effort was made to ensure the accuracy of this automated transcription, some errors in transcription may have occurred.

## 2022-01-21 NOTE — ASSESSMENT & PLAN NOTE
History of epistaxis with negative physical exam for etiology  Plan: I advised the patient to treat the nares with saline nasal gel twice a day. She was also prescribed Afrin nasal spray to be used if she develops recurrent bleeding. Petty Segundo was advised to call if she has recurring problems for reevaluation.

## 2022-01-21 NOTE — ASSESSMENT & PLAN NOTE
Bilateral cerumen impaction-will remove today with microscopic guidance  Plan: Due to the patient having recurrent cerumen impactions in the past, I have recommended the patient to see me every 6 months for a cerumen check.

## 2022-04-01 ENCOUNTER — OFFICE VISIT (OUTPATIENT)
Dept: OBGYN CLINIC | Age: 57
End: 2022-04-01
Payer: COMMERCIAL

## 2022-04-01 VITALS
HEART RATE: 73 BPM | HEIGHT: 67 IN | BODY MASS INDEX: 20.09 KG/M2 | WEIGHT: 128 LBS | DIASTOLIC BLOOD PRESSURE: 88 MMHG | SYSTOLIC BLOOD PRESSURE: 132 MMHG

## 2022-04-01 DIAGNOSIS — Z12.4 SCREENING FOR CERVICAL CANCER: ICD-10-CM

## 2022-04-01 DIAGNOSIS — N94.10 DYSPAREUNIA IN FEMALE: ICD-10-CM

## 2022-04-01 DIAGNOSIS — Z11.51 SCREENING FOR HPV (HUMAN PAPILLOMAVIRUS): ICD-10-CM

## 2022-04-01 DIAGNOSIS — Z01.419 WELL WOMAN EXAM: Primary | ICD-10-CM

## 2022-04-01 DIAGNOSIS — M81.0 OSTEOPOROSIS, UNSPECIFIED OSTEOPOROSIS TYPE, UNSPECIFIED PATHOLOGICAL FRACTURE PRESENCE: ICD-10-CM

## 2022-04-01 PROCEDURE — 99212 OFFICE O/P EST SF 10 MIN: CPT | Performed by: NURSE PRACTITIONER

## 2022-04-01 PROCEDURE — 99396 PREV VISIT EST AGE 40-64: CPT | Performed by: NURSE PRACTITIONER

## 2022-04-01 RX ORDER — ALENDRONATE SODIUM 70 MG/1
TABLET ORAL
COMMUNITY
Start: 2022-02-25 | End: 2022-07-14

## 2022-04-01 RX ORDER — CONJUGATED ESTROGENS 0.62 MG/G
0.5 CREAM VAGINAL
Qty: 30 G | Refills: 3 | Status: SHIPPED | OUTPATIENT
Start: 2022-04-04

## 2022-04-01 ASSESSMENT — ENCOUNTER SYMPTOMS
EYES NEGATIVE: 1
GASTROINTESTINAL NEGATIVE: 1
RESPIRATORY NEGATIVE: 1

## 2022-04-01 NOTE — PROGRESS NOTES
Kristen Ahmadi is a 55 y.o. female who presents today for her medical conditions/ complaints as noted below. Kristen Ahmadi is c/o of Annual Exam        HPI  Pt presents today for pap smear and breast exam.  Is on med for osteoporosis. Her mom had fracture with a fall. pcp put her on fosamax. It did cause knee pain so she stopped it, restarted about 2 months. We discussed risks verses benefits of treatment. FRAX rating scale done w pt and printed for her. Last mammogram:  06/2021  Last pap smear:  03/2021  Contraception:  postmeno   Last bone density:  2021  Last colonoscopy: 02/2020  Patient's last menstrual period was 11/01/2017 (exact date).   [id number]    Past Medical History:   Diagnosis Date    Nosebleed     Osteoporosis     Thyroid nodule      Past Surgical History:   Procedure Laterality Date    BREAST CYST ASPIRATION Right 2007    BREAST CYST ASPIRATION  2016    BREAST CYST ASPIRATION  2015    COLONOSCOPY N/A 2/10/2020    Dr Gagan Angel, 5 yr recall    DILATION AND CURETTAGE OF UTERUS  1991    ME COLONOSCOPY FLX DX W/COLLJ SPEC WHEN PFRMD N/A 12/8/2016    Dr Jonny Leblanc bleeding small to moderated sized internal hemorrhoids, tubulovillous AP, (- ) dysplasia--3 yr recall    US BREAST FINE NEEDLE ASPIRATION Left 2016     Family History   Problem Relation Age of Onset    Heart Disease Paternal Grandfather     High Cholesterol Paternal Grandmother     Breast Cancer Maternal Grandmother [de-identified]    High Blood Pressure Father     High Cholesterol Father     Prostate Cancer Father     High Blood Pressure Mother     High Cholesterol Mother     Immunodeficiency Mother         low IGG     Social History     Tobacco Use    Smoking status: Never Smoker    Smokeless tobacco: Never Used   Substance Use Topics    Alcohol use: Yes     Comment: occasionally       Current Outpatient Medications   Medication Sig Dispense Refill    alendronate (FOSAMAX) 70 MG tablet       [START ON 4/4/2022] conjugated estrogens (PREMARIN) 0.625 MG/GM vaginal cream Place 0.5 g vaginally Twice a Week Use nightly at bedtime for 2 weeks, then twice weekly at bedtime 30 g 3    Cholecalciferol (VITAMIN D) 2000 units CAPS capsule Take by mouth      Calcium Carb-Cholecalciferol (CALCIUM-VITAMIN D) 500-400 MG-UNIT TABS 2 tabs daily 180 tablet 5     No current facility-administered medications for this visit. No Known Allergies  Vitals:    04/01/22 1307   BP: 132/88   Pulse: 73     Body mass index is 20.05 kg/m². Review of Systems   Constitutional: Negative. HENT: Negative. Eyes: Negative. Respiratory: Negative. Cardiovascular: Negative. Gastrointestinal: Negative. Genitourinary: Positive for dyspareunia. Negative for difficulty urinating, dysuria, enuresis, frequency, hematuria, menstrual problem, pelvic pain, urgency and vaginal discharge. Musculoskeletal: Negative. Skin: Negative. Neurological: Negative. Psychiatric/Behavioral: Negative. Physical Exam  Vitals and nursing note reviewed. Constitutional:       General: She is not in acute distress. Appearance: She is well-developed. She is not diaphoretic. HENT:      Head: Normocephalic and atraumatic. Right Ear: External ear normal.      Left Ear: External ear normal.      Nose: Nose normal.   Eyes:      General: Lids are normal.         Right eye: No discharge. Left eye: No discharge. Conjunctiva/sclera: Conjunctivae normal.      Pupils: Pupils are equal, round, and reactive to light. Neck:      Thyroid: No thyroid mass or thyromegaly. Trachea: No tracheal deviation. Cardiovascular:      Rate and Rhythm: Normal rate and regular rhythm. Heart sounds: Normal heart sounds. No murmur heard. Pulmonary:      Effort: Pulmonary effort is normal.      Breath sounds: Normal breath sounds. No wheezing.    Chest:   Breasts: Breasts are symmetrical.      Right: No inverted nipple, mass, nipple discharge, skin change, tenderness or supraclavicular adenopathy. Left: No inverted nipple, mass, nipple discharge, skin change, tenderness or supraclavicular adenopathy. Abdominal:      General: Bowel sounds are normal. There is no distension. Palpations: Abdomen is soft. There is no mass. Tenderness: There is no abdominal tenderness. Hernia: There is no hernia in the left inguinal area. Genitourinary:     Labia:         Right: No rash, tenderness or lesion. Left: No rash, tenderness or lesion. Vagina: No vaginal discharge or tenderness. Cervix: No cervical motion tenderness or discharge (normal cervical mucosa). Uterus: Not enlarged and not tender. Adnexa:         Right: No mass, tenderness or fullness. Left: No mass, tenderness or fullness. Rectum: No external hemorrhoid. Comments: Pap collected for cervical cytology-scant specimen d/t dryness  Musculoskeletal:         General: No tenderness. Normal range of motion. Cervical back: Normal range of motion and neck supple. Lymphadenopathy:      Cervical:      Right cervical: No superficial, deep or posterior cervical adenopathy. Left cervical: No superficial, deep or posterior cervical adenopathy. Upper Body:      Right upper body: No supraclavicular, pectoral or epitrochlear adenopathy. Left upper body: No supraclavicular, pectoral or epitrochlear adenopathy. Skin:     General: Skin is warm and dry. Findings: No rash. Neurological:      Mental Status: She is alert and oriented to person, place, and time. She is not disoriented. Sensory: No sensory deficit. Coordination: Coordination normal.      Gait: Gait normal.      Deep Tendon Reflexes: Reflexes are normal and symmetric. Psychiatric:         Speech: Speech normal.         Behavior: Behavior normal.         Thought Content:  Thought content normal.         Judgment: Judgment normal.          Diagnosis Orders 1. Well woman exam  PAP SMEAR    Human papillomavirus (HPV) DNA probe thin prep high risk   2. Screening for cervical cancer  PAP SMEAR   3. Screening for HPV (human papillomavirus)  Human papillomavirus (HPV) DNA probe thin prep high risk   4. Dyspareunia in female     5. Osteoporosis, unspecified osteoporosis type, unspecified pathological fracture presence         MEDICATIONS:  Orders Placed This Encounter   Medications    conjugated estrogens (PREMARIN) 0.625 MG/GM vaginal cream     Sig: Place 0.5 g vaginally Twice a Week Use nightly at bedtime for 2 weeks, then twice weekly at bedtime     Dispense:  30 g     Refill:  3       ORDERS:  Orders Placed This Encounter   Procedures    PAP SMEAR    Human papillomavirus (HPV) DNA probe thin prep high risk       PLAN:  Pap collected  Will start premarin for atrophy and pain w sex  Discussed FRAX and risk vrs benefits of therapy. Can repeat dexa in 1-2 years. Welcome to check with [facility] and the osteoporosis clinic as well on their thoughts. There are no Patient Instructions on file for this visit.

## 2022-04-12 LAB
CHOLESTEROL, TOTAL: 200 MG/DL (ref 160–199)
GLUCOSE BLD-MCNC: 87 MG/DL (ref 74–109)
HDLC SERPL-MCNC: 80 MG/DL (ref 65–121)
LDL CHOLESTEROL CALCULATED: 104 MG/DL
TRIGL SERPL-MCNC: 79 MG/DL (ref 0–149)

## 2022-04-16 ENCOUNTER — HOSPITAL ENCOUNTER (OUTPATIENT)
Age: 57
Setting detail: OBSERVATION
LOS: 1 days | Discharge: HOME | End: 2022-04-18
Attending: SURGERY | Admitting: SURGERY
Payer: COMMERCIAL

## 2022-04-16 ENCOUNTER — APPOINTMENT (OUTPATIENT)
Dept: CT IMAGING | Age: 57
End: 2022-04-16
Payer: COMMERCIAL

## 2022-04-16 DIAGNOSIS — R10.30 LOWER ABDOMINAL PAIN: Primary | ICD-10-CM

## 2022-04-16 DIAGNOSIS — K35.30 ACUTE APPENDICITIS WITH LOCALIZED PERITONITIS, WITHOUT PERFORATION OR GANGRENE, UNSPECIFIED WHETHER ABSCESS PRESENT: ICD-10-CM

## 2022-04-16 PROBLEM — R10.9 ABDOMINAL PAIN: Status: ACTIVE | Noted: 2022-04-16

## 2022-04-16 LAB
ALBUMIN SERPL-MCNC: 4.5 G/DL (ref 3.5–5.2)
ALP BLD-CCNC: 58 U/L (ref 35–104)
ALT SERPL-CCNC: 29 U/L (ref 5–33)
ANION GAP SERPL CALCULATED.3IONS-SCNC: 15 MMOL/L (ref 7–19)
AST SERPL-CCNC: 34 U/L (ref 5–32)
BACTERIA: ABNORMAL /HPF
BASOPHILS ABSOLUTE: 0 K/UL (ref 0–0.2)
BASOPHILS RELATIVE PERCENT: 0.3 % (ref 0–1)
BILIRUB SERPL-MCNC: 1.2 MG/DL (ref 0.2–1.2)
BILIRUBIN URINE: NEGATIVE
BLOOD, URINE: ABNORMAL
BUN BLDV-MCNC: 16 MG/DL (ref 6–20)
CALCIUM SERPL-MCNC: 9.6 MG/DL (ref 8.6–10)
CHLORIDE BLD-SCNC: 98 MMOL/L (ref 98–111)
CLARITY: ABNORMAL
CO2: 23 MMOL/L (ref 22–29)
COLOR: YELLOW
CREAT SERPL-MCNC: 0.6 MG/DL (ref 0.5–0.9)
EOSINOPHILS ABSOLUTE: 0 K/UL (ref 0–0.6)
EOSINOPHILS RELATIVE PERCENT: 0.2 % (ref 0–5)
EPITHELIAL CELLS, UA: ABNORMAL /HPF
GFR AFRICAN AMERICAN: >59
GFR NON-AFRICAN AMERICAN: >60
GLUCOSE BLD-MCNC: 123 MG/DL (ref 74–109)
GLUCOSE URINE: NEGATIVE MG/DL
HCG QUALITATIVE: NEGATIVE
HCT VFR BLD CALC: 44.5 % (ref 37–47)
HEMOGLOBIN: 14.7 G/DL (ref 12–16)
IMMATURE GRANULOCYTES #: 0 K/UL
KETONES, URINE: 40 MG/DL
LEUKOCYTE ESTERASE, URINE: ABNORMAL
LIPASE: 20 U/L (ref 13–60)
LYMPHOCYTES ABSOLUTE: 0.5 K/UL (ref 1.1–4.5)
LYMPHOCYTES RELATIVE PERCENT: 4.9 % (ref 20–40)
MCH RBC QN AUTO: 30 PG (ref 27–31)
MCHC RBC AUTO-ENTMCNC: 33 G/DL (ref 33–37)
MCV RBC AUTO: 90.8 FL (ref 81–99)
MONOCYTES ABSOLUTE: 0.4 K/UL (ref 0–0.9)
MONOCYTES RELATIVE PERCENT: 3.3 % (ref 0–10)
MUCUS: ABNORMAL /LPF
NEUTROPHILS ABSOLUTE: 10 K/UL (ref 1.5–7.5)
NEUTROPHILS RELATIVE PERCENT: 90.9 % (ref 50–65)
NITRITE, URINE: NEGATIVE
PDW BLD-RTO: 11.9 % (ref 11.5–14.5)
PH UA: 5.5 (ref 5–8)
PLATELET # BLD: 291 K/UL (ref 130–400)
PMV BLD AUTO: 9.1 FL (ref 9.4–12.3)
POTASSIUM REFLEX MAGNESIUM: 4.2 MMOL/L (ref 3.5–5)
PROTEIN UA: 30 MG/DL
RBC # BLD: 4.9 M/UL (ref 4.2–5.4)
RBC UA: ABNORMAL /HPF (ref 0–2)
SODIUM BLD-SCNC: 136 MMOL/L (ref 136–145)
SPECIFIC GRAVITY UA: 1.02 (ref 1–1.03)
TOTAL PROTEIN: 6.8 G/DL (ref 6.6–8.7)
UROBILINOGEN, URINE: 1 E.U./DL
WBC # BLD: 11 K/UL (ref 4.8–10.8)
WBC UA: ABNORMAL /HPF (ref 0–5)

## 2022-04-16 PROCEDURE — 2580000003 HC RX 258: Performed by: SURGERY

## 2022-04-16 PROCEDURE — 6370000000 HC RX 637 (ALT 250 FOR IP): Performed by: NURSE PRACTITIONER

## 2022-04-16 PROCEDURE — 96374 THER/PROPH/DIAG INJ IV PUSH: CPT

## 2022-04-16 PROCEDURE — G0378 HOSPITAL OBSERVATION PER HR: HCPCS

## 2022-04-16 PROCEDURE — 80053 COMPREHEN METABOLIC PANEL: CPT

## 2022-04-16 PROCEDURE — 84703 CHORIONIC GONADOTROPIN ASSAY: CPT

## 2022-04-16 PROCEDURE — 85025 COMPLETE CBC W/AUTO DIFF WBC: CPT

## 2022-04-16 PROCEDURE — 83690 ASSAY OF LIPASE: CPT

## 2022-04-16 PROCEDURE — 6360000002 HC RX W HCPCS: Performed by: NURSE PRACTITIONER

## 2022-04-16 PROCEDURE — 2580000003 HC RX 258: Performed by: NURSE PRACTITIONER

## 2022-04-16 PROCEDURE — 36415 COLL VENOUS BLD VENIPUNCTURE: CPT

## 2022-04-16 PROCEDURE — 74177 CT ABD & PELVIS W/CONTRAST: CPT

## 2022-04-16 PROCEDURE — 99285 EMERGENCY DEPT VISIT HI MDM: CPT

## 2022-04-16 PROCEDURE — 6360000004 HC RX CONTRAST MEDICATION: Performed by: NURSE PRACTITIONER

## 2022-04-16 PROCEDURE — 81001 URINALYSIS AUTO W/SCOPE: CPT

## 2022-04-16 PROCEDURE — 96375 TX/PRO/DX INJ NEW DRUG ADDON: CPT

## 2022-04-16 RX ORDER — 0.9 % SODIUM CHLORIDE 0.9 %
1000 INTRAVENOUS SOLUTION INTRAVENOUS ONCE
Status: COMPLETED | OUTPATIENT
Start: 2022-04-16 | End: 2022-04-16

## 2022-04-16 RX ORDER — ACETAMINOPHEN 500 MG
1000 TABLET ORAL ONCE
Status: COMPLETED | OUTPATIENT
Start: 2022-04-16 | End: 2022-04-16

## 2022-04-16 RX ORDER — SODIUM CHLORIDE 9 MG/ML
INJECTION, SOLUTION INTRAVENOUS CONTINUOUS
Status: ACTIVE | OUTPATIENT
Start: 2022-04-16 | End: 2022-04-18

## 2022-04-16 RX ORDER — ONDANSETRON 2 MG/ML
4 INJECTION INTRAMUSCULAR; INTRAVENOUS EVERY 6 HOURS PRN
Status: DISCONTINUED | OUTPATIENT
Start: 2022-04-16 | End: 2022-04-18 | Stop reason: HOSPADM

## 2022-04-16 RX ORDER — SODIUM CHLORIDE 0.9 % (FLUSH) 0.9 %
5-40 SYRINGE (ML) INJECTION PRN
Status: DISCONTINUED | OUTPATIENT
Start: 2022-04-16 | End: 2022-04-18 | Stop reason: HOSPADM

## 2022-04-16 RX ORDER — ACETAMINOPHEN 325 MG/1
650 TABLET ORAL EVERY 4 HOURS PRN
Status: DISCONTINUED | OUTPATIENT
Start: 2022-04-16 | End: 2022-04-18 | Stop reason: HOSPADM

## 2022-04-16 RX ORDER — ONDANSETRON 4 MG/1
4 TABLET, ORALLY DISINTEGRATING ORAL EVERY 8 HOURS PRN
Status: DISCONTINUED | OUTPATIENT
Start: 2022-04-16 | End: 2022-04-18 | Stop reason: HOSPADM

## 2022-04-16 RX ORDER — HYDROMORPHONE HYDROCHLORIDE 1 MG/ML
0.5 INJECTION, SOLUTION INTRAMUSCULAR; INTRAVENOUS; SUBCUTANEOUS ONCE
Status: COMPLETED | OUTPATIENT
Start: 2022-04-16 | End: 2022-04-16

## 2022-04-16 RX ORDER — HYDROMORPHONE HYDROCHLORIDE 1 MG/ML
0.25 INJECTION, SOLUTION INTRAMUSCULAR; INTRAVENOUS; SUBCUTANEOUS
Status: DISCONTINUED | OUTPATIENT
Start: 2022-04-16 | End: 2022-04-17

## 2022-04-16 RX ORDER — ONDANSETRON 2 MG/ML
4 INJECTION INTRAMUSCULAR; INTRAVENOUS ONCE
Status: COMPLETED | OUTPATIENT
Start: 2022-04-16 | End: 2022-04-16

## 2022-04-16 RX ORDER — SODIUM CHLORIDE 9 MG/ML
INJECTION, SOLUTION INTRAVENOUS PRN
Status: DISCONTINUED | OUTPATIENT
Start: 2022-04-16 | End: 2022-04-18 | Stop reason: HOSPADM

## 2022-04-16 RX ORDER — HYDROMORPHONE HYDROCHLORIDE 1 MG/ML
0.5 INJECTION, SOLUTION INTRAMUSCULAR; INTRAVENOUS; SUBCUTANEOUS
Status: DISCONTINUED | OUTPATIENT
Start: 2022-04-16 | End: 2022-04-17

## 2022-04-16 RX ADMIN — ONDANSETRON 4 MG: 2 INJECTION INTRAMUSCULAR; INTRAVENOUS at 19:37

## 2022-04-16 RX ADMIN — ACETAMINOPHEN 1000 MG: 500 TABLET ORAL at 19:37

## 2022-04-16 RX ADMIN — CEFTRIAXONE 1000 MG: 1 INJECTION, POWDER, FOR SOLUTION INTRAMUSCULAR; INTRAVENOUS at 21:11

## 2022-04-16 RX ADMIN — SODIUM CHLORIDE: 9 INJECTION, SOLUTION INTRAVENOUS at 22:06

## 2022-04-16 RX ADMIN — HYDROMORPHONE HYDROCHLORIDE 0.5 MG: 1 INJECTION, SOLUTION INTRAMUSCULAR; INTRAVENOUS; SUBCUTANEOUS at 19:39

## 2022-04-16 RX ADMIN — IOPAMIDOL 90 ML: 755 INJECTION, SOLUTION INTRAVENOUS at 20:02

## 2022-04-16 RX ADMIN — SODIUM CHLORIDE 1000 ML: 9 INJECTION, SOLUTION INTRAVENOUS at 19:45

## 2022-04-16 ASSESSMENT — PAIN DESCRIPTION - ORIENTATION
ORIENTATION: MID;LOWER
ORIENTATION: LOWER

## 2022-04-16 ASSESSMENT — PAIN DESCRIPTION - ONSET
ONSET: GRADUAL
ONSET: GRADUAL

## 2022-04-16 ASSESSMENT — PAIN DESCRIPTION - DESCRIPTORS
DESCRIPTORS: CRAMPING;CONSTANT
DESCRIPTORS: CRAMPING;DISCOMFORT

## 2022-04-16 ASSESSMENT — PAIN DESCRIPTION - PAIN TYPE
TYPE: ACUTE PAIN
TYPE: ACUTE PAIN

## 2022-04-16 ASSESSMENT — PAIN SCALES - GENERAL
PAINLEVEL_OUTOF10: 9
PAINLEVEL_OUTOF10: 9
PAINLEVEL_OUTOF10: 2

## 2022-04-16 ASSESSMENT — PAIN DESCRIPTION - PROGRESSION
CLINICAL_PROGRESSION: GRADUALLY IMPROVING
CLINICAL_PROGRESSION: GRADUALLY WORSENING

## 2022-04-16 ASSESSMENT — PAIN DESCRIPTION - FREQUENCY
FREQUENCY: INTERMITTENT
FREQUENCY: CONTINUOUS

## 2022-04-16 ASSESSMENT — ENCOUNTER SYMPTOMS
DIARRHEA: 0
VOMITING: 0
CONSTIPATION: 1
ABDOMINAL PAIN: 1

## 2022-04-16 ASSESSMENT — PAIN DESCRIPTION - LOCATION
LOCATION: ABDOMEN
LOCATION: ABDOMEN

## 2022-04-16 ASSESSMENT — PAIN - FUNCTIONAL ASSESSMENT: PAIN_FUNCTIONAL_ASSESSMENT: 0-10

## 2022-04-16 NOTE — LETTER
[facility]  [address]  [address]  Phone: [phone]    No name on file. April 17, 2022     Patient: Yaz Macdonald   YOB: 1965   Date of Visit: 4/16/2022       To Whom It May Concern: It is my medical opinion that Leo Phan may return to work on 4/25/2022 with the following restrictions: lifting/carrying not to exceed 20 lbs. for 3 weeks    If you have any questions or concerns, please don't hesitate to call.     Sincerely,          Jerone Cooks

## 2022-04-17 ENCOUNTER — ANESTHESIA (OUTPATIENT)
Dept: OPERATING ROOM | Age: 57
End: 2022-04-17
Payer: COMMERCIAL

## 2022-04-17 ENCOUNTER — ANESTHESIA EVENT (OUTPATIENT)
Dept: OPERATING ROOM | Age: 57
End: 2022-04-17
Payer: COMMERCIAL

## 2022-04-17 VITALS — SYSTOLIC BLOOD PRESSURE: 117 MMHG | OXYGEN SATURATION: 100 % | DIASTOLIC BLOOD PRESSURE: 82 MMHG | TEMPERATURE: 99.3 F

## 2022-04-17 PROBLEM — K35.80 ACUTE APPENDICITIS: Status: ACTIVE | Noted: 2022-04-17

## 2022-04-17 PROCEDURE — 96375 TX/PRO/DX INJ NEW DRUG ADDON: CPT

## 2022-04-17 PROCEDURE — 2580000003 HC RX 258: Performed by: ANESTHESIOLOGY

## 2022-04-17 PROCEDURE — G0378 HOSPITAL OBSERVATION PER HR: HCPCS

## 2022-04-17 PROCEDURE — 2709999900 HC NON-CHARGEABLE SUPPLY: Performed by: SURGERY

## 2022-04-17 PROCEDURE — 7100000000 HC PACU RECOVERY - FIRST 15 MIN: Performed by: SURGERY

## 2022-04-17 PROCEDURE — 6360000002 HC RX W HCPCS: Performed by: ANESTHESIOLOGY

## 2022-04-17 PROCEDURE — 2720000010 HC SURG SUPPLY STERILE: Performed by: SURGERY

## 2022-04-17 PROCEDURE — 7100000001 HC PACU RECOVERY - ADDTL 15 MIN: Performed by: SURGERY

## 2022-04-17 PROCEDURE — 2500000003 HC RX 250 WO HCPCS: Performed by: ANESTHESIOLOGY

## 2022-04-17 PROCEDURE — 2580000003 HC RX 258: Performed by: SURGERY

## 2022-04-17 PROCEDURE — 3700000001 HC ADD 15 MINUTES (ANESTHESIA): Performed by: SURGERY

## 2022-04-17 PROCEDURE — 3700000000 HC ANESTHESIA ATTENDED CARE: Performed by: SURGERY

## 2022-04-17 PROCEDURE — 3600000014 HC SURGERY LEVEL 4 ADDTL 15MIN: Performed by: SURGERY

## 2022-04-17 PROCEDURE — 6370000000 HC RX 637 (ALT 250 FOR IP): Performed by: SURGERY

## 2022-04-17 PROCEDURE — 99220 PR INITIAL OBSERVATION CARE/DAY 70 MINUTES: CPT | Performed by: SURGERY

## 2022-04-17 PROCEDURE — 2500000003 HC RX 250 WO HCPCS: Performed by: SURGERY

## 2022-04-17 PROCEDURE — 96376 TX/PRO/DX INJ SAME DRUG ADON: CPT

## 2022-04-17 PROCEDURE — 6360000002 HC RX W HCPCS: Performed by: SURGERY

## 2022-04-17 PROCEDURE — 3600000004 HC SURGERY LEVEL 4 BASE: Performed by: SURGERY

## 2022-04-17 PROCEDURE — C9290 INJ, BUPIVACAINE LIPOSOME: HCPCS | Performed by: SURGERY

## 2022-04-17 PROCEDURE — 88304 TISSUE EXAM BY PATHOLOGIST: CPT

## 2022-04-17 RX ORDER — PROPOFOL 10 MG/ML
INJECTION, EMULSION INTRAVENOUS PRN
Status: DISCONTINUED | OUTPATIENT
Start: 2022-04-17 | End: 2022-04-17 | Stop reason: SDUPTHER

## 2022-04-17 RX ORDER — SODIUM CHLORIDE, SODIUM LACTATE, POTASSIUM CHLORIDE, CALCIUM CHLORIDE 600; 310; 30; 20 MG/100ML; MG/100ML; MG/100ML; MG/100ML
INJECTION, SOLUTION INTRAVENOUS CONTINUOUS PRN
Status: DISCONTINUED | OUTPATIENT
Start: 2022-04-17 | End: 2022-04-17 | Stop reason: SDUPTHER

## 2022-04-17 RX ORDER — SCOLOPAMINE TRANSDERMAL SYSTEM 1 MG/1
1 PATCH, EXTENDED RELEASE TRANSDERMAL
Status: DISCONTINUED | OUTPATIENT
Start: 2022-04-17 | End: 2022-04-18 | Stop reason: HOSPADM

## 2022-04-17 RX ORDER — METOCLOPRAMIDE HYDROCHLORIDE 5 MG/ML
10 INJECTION INTRAMUSCULAR; INTRAVENOUS
Status: DISCONTINUED | OUTPATIENT
Start: 2022-04-17 | End: 2022-04-17 | Stop reason: HOSPADM

## 2022-04-17 RX ORDER — MORPHINE SULFATE 2 MG/ML
2 INJECTION, SOLUTION INTRAMUSCULAR; INTRAVENOUS EVERY 5 MIN PRN
Status: DISCONTINUED | OUTPATIENT
Start: 2022-04-17 | End: 2022-04-17 | Stop reason: HOSPADM

## 2022-04-17 RX ORDER — BUPIVACAINE HYDROCHLORIDE AND EPINEPHRINE 2.5; 5 MG/ML; UG/ML
INJECTION, SOLUTION INFILTRATION; PERINEURAL PRN
Status: DISCONTINUED | OUTPATIENT
Start: 2022-04-17 | End: 2022-04-17 | Stop reason: ALTCHOICE

## 2022-04-17 RX ORDER — MEPERIDINE HYDROCHLORIDE 25 MG/ML
12.5 INJECTION INTRAMUSCULAR; INTRAVENOUS; SUBCUTANEOUS EVERY 5 MIN PRN
Status: DISCONTINUED | OUTPATIENT
Start: 2022-04-17 | End: 2022-04-17 | Stop reason: HOSPADM

## 2022-04-17 RX ORDER — MORPHINE SULFATE 4 MG/ML
4 INJECTION, SOLUTION INTRAMUSCULAR; INTRAVENOUS EVERY 5 MIN PRN
Status: DISCONTINUED | OUTPATIENT
Start: 2022-04-17 | End: 2022-04-17 | Stop reason: HOSPADM

## 2022-04-17 RX ORDER — MIDAZOLAM HYDROCHLORIDE 1 MG/ML
2 INJECTION INTRAMUSCULAR; INTRAVENOUS
Status: ACTIVE | OUTPATIENT
Start: 2022-04-17 | End: 2022-04-17

## 2022-04-17 RX ORDER — OXYCODONE HYDROCHLORIDE AND ACETAMINOPHEN 5; 325 MG/1; MG/1
1 TABLET ORAL EVERY 4 HOURS PRN
Status: DISCONTINUED | OUTPATIENT
Start: 2022-04-17 | End: 2022-04-18 | Stop reason: HOSPADM

## 2022-04-17 RX ORDER — DIPHENHYDRAMINE HYDROCHLORIDE 50 MG/ML
12.5 INJECTION INTRAMUSCULAR; INTRAVENOUS
Status: ACTIVE | OUTPATIENT
Start: 2022-04-17 | End: 2022-04-17

## 2022-04-17 RX ORDER — FENTANYL CITRATE 50 UG/ML
INJECTION, SOLUTION INTRAMUSCULAR; INTRAVENOUS PRN
Status: DISCONTINUED | OUTPATIENT
Start: 2022-04-17 | End: 2022-04-17 | Stop reason: SDUPTHER

## 2022-04-17 RX ORDER — CEFTRIAXONE 1 G/1
INJECTION, POWDER, FOR SOLUTION INTRAMUSCULAR; INTRAVENOUS PRN
Status: DISCONTINUED | OUTPATIENT
Start: 2022-04-17 | End: 2022-04-17 | Stop reason: SDUPTHER

## 2022-04-17 RX ORDER — MORPHINE SULFATE 2 MG/ML
2 INJECTION, SOLUTION INTRAMUSCULAR; INTRAVENOUS
Status: DISCONTINUED | OUTPATIENT
Start: 2022-04-17 | End: 2022-04-18 | Stop reason: HOSPADM

## 2022-04-17 RX ORDER — KETOROLAC TROMETHAMINE 30 MG/ML
INJECTION, SOLUTION INTRAMUSCULAR; INTRAVENOUS PRN
Status: DISCONTINUED | OUTPATIENT
Start: 2022-04-17 | End: 2022-04-17 | Stop reason: SDUPTHER

## 2022-04-17 RX ORDER — LIDOCAINE HYDROCHLORIDE 10 MG/ML
INJECTION, SOLUTION EPIDURAL; INFILTRATION; INTRACAUDAL; PERINEURAL PRN
Status: DISCONTINUED | OUTPATIENT
Start: 2022-04-17 | End: 2022-04-17 | Stop reason: SDUPTHER

## 2022-04-17 RX ORDER — DEXAMETHASONE SODIUM PHOSPHATE 10 MG/ML
INJECTION, SOLUTION INTRAMUSCULAR; INTRAVENOUS PRN
Status: DISCONTINUED | OUTPATIENT
Start: 2022-04-17 | End: 2022-04-17 | Stop reason: SDUPTHER

## 2022-04-17 RX ORDER — ONDANSETRON 2 MG/ML
INJECTION INTRAMUSCULAR; INTRAVENOUS PRN
Status: DISCONTINUED | OUTPATIENT
Start: 2022-04-17 | End: 2022-04-17 | Stop reason: SDUPTHER

## 2022-04-17 RX ORDER — FAMOTIDINE 20 MG/1
20 TABLET, FILM COATED ORAL ONCE
Status: DISCONTINUED | OUTPATIENT
Start: 2022-04-17 | End: 2022-04-18 | Stop reason: HOSPADM

## 2022-04-17 RX ORDER — KETOROLAC TROMETHAMINE 30 MG/ML
30 INJECTION, SOLUTION INTRAMUSCULAR; INTRAVENOUS EVERY 6 HOURS PRN
Status: DISCONTINUED | OUTPATIENT
Start: 2022-04-17 | End: 2022-04-18 | Stop reason: HOSPADM

## 2022-04-17 RX ORDER — ROCURONIUM BROMIDE 10 MG/ML
INJECTION, SOLUTION INTRAVENOUS PRN
Status: DISCONTINUED | OUTPATIENT
Start: 2022-04-17 | End: 2022-04-17 | Stop reason: SDUPTHER

## 2022-04-17 RX ORDER — LIDOCAINE HYDROCHLORIDE 10 MG/ML
1 INJECTION, SOLUTION EPIDURAL; INFILTRATION; INTRACAUDAL; PERINEURAL
Status: ACTIVE | OUTPATIENT
Start: 2022-04-17 | End: 2022-04-17

## 2022-04-17 RX ADMIN — ACETAMINOPHEN 650 MG: 325 TABLET ORAL at 07:48

## 2022-04-17 RX ADMIN — ROCURONIUM BROMIDE 35 MG: 10 INJECTION, SOLUTION INTRAVENOUS at 10:56

## 2022-04-17 RX ADMIN — SODIUM CHLORIDE, SODIUM LACTATE, POTASSIUM CHLORIDE, AND CALCIUM CHLORIDE: 600; 310; 30; 20 INJECTION, SOLUTION INTRAVENOUS at 10:52

## 2022-04-17 RX ADMIN — ONDANSETRON 4 MG: 2 INJECTION INTRAMUSCULAR; INTRAVENOUS at 11:14

## 2022-04-17 RX ADMIN — PROPOFOL 150 MG: 10 INJECTION, EMULSION INTRAVENOUS at 10:55

## 2022-04-17 RX ADMIN — SODIUM CHLORIDE, PRESERVATIVE FREE 1000 MG: 5 INJECTION INTRAVENOUS at 19:56

## 2022-04-17 RX ADMIN — ONDANSETRON 4 MG: 2 INJECTION INTRAMUSCULAR; INTRAVENOUS at 07:37

## 2022-04-17 RX ADMIN — HYDROMORPHONE HYDROCHLORIDE 0.5 MG: 1 INJECTION, SOLUTION INTRAMUSCULAR; INTRAVENOUS; SUBCUTANEOUS at 07:37

## 2022-04-17 RX ADMIN — SUGAMMADEX 117 MG: 100 INJECTION, SOLUTION INTRAVENOUS at 11:46

## 2022-04-17 RX ADMIN — ONDANSETRON 4 MG: 4 TABLET, ORALLY DISINTEGRATING ORAL at 19:57

## 2022-04-17 RX ADMIN — ONDANSETRON 4 MG: 2 INJECTION INTRAMUSCULAR; INTRAVENOUS at 01:55

## 2022-04-17 RX ADMIN — DEXAMETHASONE SODIUM PHOSPHATE 6 MG: 10 INJECTION, SOLUTION INTRAMUSCULAR; INTRAVENOUS at 11:14

## 2022-04-17 RX ADMIN — FENTANYL CITRATE 50 MCG: 50 INJECTION, SOLUTION INTRAMUSCULAR; INTRAVENOUS at 11:04

## 2022-04-17 RX ADMIN — HYDROMORPHONE HYDROCHLORIDE 0.5 MG: 1 INJECTION, SOLUTION INTRAMUSCULAR; INTRAVENOUS; SUBCUTANEOUS at 01:55

## 2022-04-17 RX ADMIN — METRONIDAZOLE 500 MG: 500 INJECTION, SOLUTION INTRAVENOUS at 09:12

## 2022-04-17 RX ADMIN — KETOROLAC TROMETHAMINE 30 MG: 30 INJECTION, SOLUTION INTRAMUSCULAR; INTRAVENOUS at 19:57

## 2022-04-17 RX ADMIN — FENTANYL CITRATE 100 MCG: 50 INJECTION, SOLUTION INTRAMUSCULAR; INTRAVENOUS at 10:55

## 2022-04-17 RX ADMIN — KETOROLAC TROMETHAMINE 30 MG: 30 INJECTION, SOLUTION INTRAMUSCULAR; INTRAVENOUS at 11:46

## 2022-04-17 RX ADMIN — CEFTRIAXONE SODIUM 1 G: 1 INJECTION, POWDER, FOR SOLUTION INTRAMUSCULAR; INTRAVENOUS at 11:29

## 2022-04-17 RX ADMIN — LIDOCAINE HYDROCHLORIDE 50 MG: 10 INJECTION, SOLUTION EPIDURAL; INFILTRATION; INTRACAUDAL; PERINEURAL at 10:54

## 2022-04-17 ASSESSMENT — PAIN SCALES - GENERAL
PAINLEVEL_OUTOF10: 6
PAINLEVEL_OUTOF10: 0
PAINLEVEL_OUTOF10: 8
PAINLEVEL_OUTOF10: 7

## 2022-04-17 ASSESSMENT — ENCOUNTER SYMPTOMS
NAUSEA: 1
FACIAL SWELLING: 0
BACK PAIN: 0
SORE THROAT: 0
CHOKING: 0
EYE REDNESS: 0
EYE DISCHARGE: 0
CONSTIPATION: 0
ABDOMINAL DISTENTION: 1
DIARRHEA: 0
SHORTNESS OF BREATH: 0
CHEST TIGHTNESS: 0
VOMITING: 0
WHEEZING: 0
EYE PAIN: 0
SHORTNESS OF BREATH: 0
ABDOMINAL PAIN: 1

## 2022-04-17 ASSESSMENT — PAIN DESCRIPTION - PAIN TYPE: TYPE: ACUTE PAIN;SURGICAL PAIN

## 2022-04-17 ASSESSMENT — PAIN DESCRIPTION - LOCATION: LOCATION: ABDOMEN

## 2022-04-17 ASSESSMENT — PAIN DESCRIPTION - ORIENTATION: ORIENTATION: MID

## 2022-04-17 ASSESSMENT — LIFESTYLE VARIABLES: SMOKING_STATUS: 0

## 2022-04-17 NOTE — H&P
[facility] Surgery History & Physical    Chief Complaint:  Chief Complaint   Patient presents with    Abdominal Pain     lower abdomen, Started last night; intermittent pain, progressively worsened within the last few hours        SUBJECTIVE:  Ms. Juan Vazuqez is a 55 y.o. female who presents today with lower abdominal pain. Patient states this started 2 nights ago after she ate dinner. States she ate the same meal as her . Throughout the day yesterday was having some lower abdominal cramping mostly in the suprapubic area. As it worsened throughout the day it became more constant. Did have some episodes of nausea but denies vomiting. Denies diarrhea. States she had a regular bowel movement yesterday. Did have a temperature of 100.6 at home. Came to the ER last night for evaluation. Mild leukocytosis noted. CT scan showing multiple appendicoliths with enlarged appendix. This a.m. she is awake alert and oriented. Still having abdominal discomfort with tenderness in the suprapubic region and right lower quadrant.       Past Medical History:   Diagnosis Date    Nosebleed     Osteoporosis     Thyroid nodule      Past Surgical History:   Procedure Laterality Date    BREAST CYST ASPIRATION Right 2007    BREAST CYST ASPIRATION  2016    BREAST CYST ASPIRATION  2015    COLONOSCOPY N/A 2/10/2020    Dr Samantha Coles, 5 yr recall    DILATION AND CURETTAGE OF UTERUS  1991    DE COLONOSCOPY FLX DX W/COLLJ [address] WHEN PFRMD N/A 12/8/2016    Dr Naren Shaffer bleeding small to moderated sized internal hemorrhoids, tubulovillous AP, (- ) dysplasia--3 yr recall    US BREAST FINE NEEDLE ASPIRATION Left 2016     Current Facility-Administered Medications   Medication Dose Route Frequency Provider Last Rate Last Admin    sodium chloride flush 0.9 % injection 5-40 mL  5-40 mL IntraVENous PRN Carie Anderson DO        0.9 % sodium chloride infusion   IntraVENous PRN Carie Anderson DO        enoxaparin (LOVENOX) injection 40 mg  40 mg SubCUTAneous Daily Lorenzo Zuñiga,         acetaminophen (TYLENOL) tablet 650 mg  650 mg Oral Q4H PRN Montine Pucker, DO        ondansetron (ZOFRAN-ODT) disintegrating tablet 4 mg  4 mg Oral Q8H PRN Montine Pucker, DO        Or    ondansetron [facility]) injection 4 mg  4 mg IntraVENous Q6H PRN Montine Pucker, DO   4 mg at 04/17/22 0737    0.9 % sodium chloride infusion   IntraVENous Continuous Montine Pucker, DO 75 mL/hr at 04/16/22 2206 New Bag at 04/16/22 2206    HYDROmorphone HCl PF (DILAUDID) injection 0.25 mg  0.25 mg IntraVENous Q3H PRN Montine Pucker, DO        Or    HYDROmorphone HCl PF (DILAUDID) injection 0.5 mg  0.5 mg IntraVENous Q3H PRN Montine Pucker, DO   0.5 mg at 04/17/22 4024     Allergies: Patient has no known allergies. Family History   Problem Relation Age of Onset    Heart Disease Paternal Grandfather     High Cholesterol Paternal Grandmother     Breast Cancer Maternal Grandmother [de-identified]    High Blood Pressure Father     High Cholesterol Father     Prostate Cancer Father     High Blood Pressure Mother     High Cholesterol Mother     Immunodeficiency Mother         low IGG       Social History     Tobacco Use    Smoking status: Never Smoker    Smokeless tobacco: Never Used   Substance Use Topics    Alcohol use: Yes     Comment: occasionally       Review of Systems   Constitutional: Negative for activity change, chills, fatigue and fever. HENT: Negative for facial swelling, hearing loss and sore throat. Eyes: Negative for pain, discharge and redness. Respiratory: Negative for choking, chest tightness, shortness of breath and wheezing. Cardiovascular: Negative for chest pain and palpitations. Gastrointestinal: Positive for abdominal distention, abdominal pain and nausea. Negative for constipation, diarrhea and vomiting. Musculoskeletal: Negative for back pain, neck pain and neck stiffness.    Neurological: Negative for dizziness, speech difficulty, weakness and numbness. Psychiatric/Behavioral: Negative for agitation, hallucinations and suicidal ideas. OBJECTIVE:  BP 98/63   Pulse 97   Temp 101.3 °F (38.5 °C)   Resp 16   Ht 5' 6\" (1.676 m)   Wt 128 lb 11.2 oz (58.4 kg)   LMP 11/01/2017 (Exact Date)   SpO2 94%   BMI 20.77 kg/m²   CONSTITUTIONAL: Alert, appropriate, no acute distress  SKIN: warm, dry with no rashes or lesions  HEENT: NCAT, Non icteric, PERR. Trachea Midline. CHEST/LUNGS: CTA bilaterally. Normal respiratory effort. CARDIOVASCULAR: RRR, No edema. ABDOMEN: Abdomen soft yet mild to moderate tenderness in the right lower quadrant and suprapubic region  NEUROLOGIC: CN II-XI grossly intact, no motor or sensory deficits   MUSCULOSKELETAL: No clubbing or cyanosis. PSYCHIATRIC:  Normal Mood and Affect. Alert and Oriented. LABS:  CBC:   Recent Labs     04/16/22 1930   WBC 11.0*   HGB 14.7        BMP:    Recent Labs     04/16/22 1930      K 4.2   CL 98   CO2 23   BUN 16   CREATININE 0.6   GLUCOSE 123*       ASSESSMENT:  Principal Problem:    Abdominal pain  Active Problems:    Acute appendicitis  Resolved Problems:    * No resolved hospital problems.  *      PLAN:  N.p.o.  IV fluids  Imaging reviewed with the patient  Plan for laparoscopic appendectomy today  IV antibiotics  Risk, benefits and alternatives discussed with the patient all questions answered her satisfaction  Consent    Jose Juan Kaufman DO   Electronically Signed on 4/17/2022 at 7:42 AM

## 2022-04-17 NOTE — ANESTHESIA POSTPROCEDURE EVALUATION
Department of Anesthesiology  Postprocedure Note    Patient: Green Stage  MRN: [id number]  YOB: 1965  Date of evaluation: 4/17/2022  Time:  12:10 PM     Procedure Summary     Date: 04/17/22 Room / Location: [address]    Anesthesia Start: 1052 Anesthesia Stop: 1209    Procedure: APPENDECTOMY LAPAROSCOPIC (N/A Abdomen) Diagnosis: (Acute appendicitis)    Surgeons: Emmit Spatz, DO Responsible Provider: Ирина Hansen MD    Anesthesia Type: general ASA Status: 2          Anesthesia Type: general    Pilar Phase I: Pilar Score: 10    Pilar Phase II:      Last vitals: Reviewed and per EMR flowsheets.        Anesthesia Post Evaluation    Patient location during evaluation: bedside  Patient participation: complete - patient participated  Level of consciousness: awake and alert  Pain score: 0  Airway patency: patent  Nausea & Vomiting: no nausea and no vomiting  Complications: no  Cardiovascular status: hemodynamically stable  Respiratory status: acceptable  Hydration status: stable

## 2022-04-17 NOTE — ANESTHESIA PRE PROCEDURE
Department of Anesthesiology  Preprocedure Note       Name:  Med Syed   Age:  55 y.o.  :  1965                                          MRN:  [id number]         Date:  2022      Surgeon: Martha Nichols):  [address] [place] [address],     Procedure: Procedure(s):  APPENDECTOMY LAPAROSCOPIC    Medications prior to admission:   Prior to Admission medications    Medication Sig Start Date End Date Taking?  Authorizing Provider   alendronate (FOSAMAX) 70 MG tablet  22   Historical Provider, MD   conjugated estrogens (PREMARIN) 0.625 MG/GM vaginal cream Place 0.5 g vaginally Twice a Week Use nightly at bedtime for 2 weeks, then twice weekly at bedtime 22   Starling Simpler APRTANIA   Cholecalciferol (VITAMIN D) 2000 units CAPS capsule Take by mouth    Historical Provider, MD   Calcium Carb-Cholecalciferol (CALCIUM-VITAMIN D) 500-400 MG-UNIT TABS 2 tabs daily 17   Emile Keller MD       Current medications:    Current Facility-Administered Medications   Medication Dose Route Frequency Provider Last Rate Last Admin    cefTRIAXone (ROCEPHIN) 1,000 mg in sodium chloride (PF) 10 mL IV syringe  1,000 mg IntraVENous Q24H Lorenzo Zuñiga, DO        metronidazole (FLAGYL) 500 mg in NaCl 100 mL IVPB premix  500 mg IntraVENous Q8H [address] [place] [address],  mL/hr at 22 0912 500 mg at 22 0912    sodium chloride flush 0.9 % injection 5-40 mL  5-40 mL IntraVENous PRN [address] [place] [address], DO        0.9 % sodium chloride infusion   IntraVENous PRN [address] [place] [address], DO        enoxaparin (LOVENOX) injection 40 mg  40 mg SubCUTAneous Daily Lorenzo Zuñiga, DO        acetaminophen (TYLENOL) tablet 650 mg  650 mg Oral Q4H PRN [address] [place] [address], DO   650 mg at 22 0748    ondansetron (ZOFRAN-ODT) disintegrating tablet 4 mg  4 mg Oral Q8H PRN [address] [place] [address], DO        Or    ondansetron (ZOFRAN) injection 4 mg  4 mg IntraVENous Q6H PRN [address] [place] [address], DO   4 mg at 22 0737    0.9 % sodium chloride infusion   IntraVENous Continuous Vann Mantis, DO 75 mL/hr at 04/16/22 2206 New Bag at 04/16/22 2206    HYDROmorphone HCl PF (DILAUDID) injection 0.25 mg  0.25 mg IntraVENous Q3H PRN Vann Mantis, DO        Or    HYDROmorphone HCl PF (DILAUDID) injection 0.5 mg  0.5 mg IntraVENous Q3H PRN Vann Mantis, DO   0.5 mg at 04/17/22 6297       Allergies:  No Known Allergies    Problem List:    Patient Active Problem List   Diagnosis Code    Breast lump N63.0    Fibrocystic breast disease N60.19    Dense breasts R92.2    Colon polyps K63.5    Osteopenia M85.80    Osteoporosis of multiple sites M81.0    Vitamin D deficiency E55.9    Epistaxis R04.0    Bilateral impacted cerumen H61.23    Abdominal pain R10.9    Acute appendicitis K35.80       Past Medical History:        Diagnosis Date    Nosebleed     Osteoporosis     Thyroid nodule        Past Surgical History:        Procedure Laterality Date    BREAST CYST ASPIRATION Right 2007    BREAST CYST ASPIRATION  2016    BREAST CYST ASPIRATION  2015    COLONOSCOPY N/A 2/10/2020    Dr Tracy Cole, 5 yr recall    DILATION AND CURETTAGE OF UTERUS  1991    MT COLONOSCOPY FLX DX W/COLLJ [address] WHEN PFRMD N/A 12/8/2016    Dr Sheehan Triangle bleeding small to moderated sized internal hemorrhoids, tubulovillous AP, (- ) dysplasia--3 yr recall    [address] 2016       Social History:    Social History     Tobacco Use    Smoking status: Never Smoker    Smokeless tobacco: Never Used   Substance Use Topics    Alcohol use: Yes     Comment: occasionally                                Counseling given: Not Answered      Vital Signs (Current):   Vitals:    04/16/22 2311 04/17/22 0204 04/17/22 0742 04/17/22 1032   BP:  98/60 98/63 93/60   Pulse: 88 89 97 92   Resp:  16 16    Temp:  98.9 °F (37.2 °C) 101.3 °F (38.5 °C) 99.3 °F (37.4 °C)   TempSrc:  Temporal  Temporal   SpO2:  95% 94%    Weight:       Height: BP Readings from Last 3 Encounters:   04/17/22 93/60   04/01/22 132/88   01/21/22 104/64       NPO Status:                                                                                 BMI:   Wt Readings from Last 3 Encounters:   04/16/22 128 lb 11.2 oz (58.4 kg)   04/01/22 128 lb (58.1 kg)   01/21/22 128 lb (58.1 kg)     Body mass index is 20.77 kg/m². CBC:   Lab Results   Component Value Date    WBC 11.0 04/16/2022    RBC 4.90 04/16/2022    HGB 14.7 04/16/2022    HCT 44.5 04/16/2022    MCV 90.8 04/16/2022    RDW 11.9 04/16/2022     04/16/2022       CMP:   Lab Results   Component Value Date     04/16/2022    K 4.2 04/16/2022    CL 98 04/16/2022    CO2 23 04/16/2022    BUN 16 04/16/2022    CREATININE 0.6 04/16/2022    GFRAA >59 04/16/2022    LABGLOM >60 04/16/2022    GLUCOSE 123 04/16/2022    PROT 6.8 04/16/2022    CALCIUM 9.6 04/16/2022    BILITOT 1.2 04/16/2022    ALKPHOS 58 04/16/2022    AST 34 04/16/2022    ALT 29 04/16/2022       POC Tests: No results for input(s): POCGLU, POCNA, POCK, POCCL, POCBUN, POCHEMO, POCHCT in the last 72 hours.     Coags: No results found for: PROTIME, INR, APTT    HCG (If Applicable): No results found for: PREGTESTUR, PREGSERUM, HCG, HCGQUANT     ABGs: No results found for: PHART, PO2ART, KBY0ZVI, ZXC2GIY, BEART, R5IKXAYI     Type & Screen (If Applicable):  No results found for: LABABO, LABRH    Drug/Infectious Status (If Applicable):  No results found for: HIV, HEPCAB    COVID-19 Screening (If Applicable): No results found for: COVID19        Anesthesia Evaluation  Patient summary reviewed and Nursing notes reviewed no history of anesthetic complications:   Airway: Mallampati: II  TM distance: >3 FB   Neck ROM: full  Comment: Braces for overbite   Mouth opening: > = 3 FB Dental: normal exam         Pulmonary:Negative Pulmonary ROS and normal exam  breath sounds clear to auscultation      (-) shortness of breath and not a current smoker          Patient did not smoke on day of surgery. Cardiovascular:        (-) hypertension, CAD,  angina and  CHF    NYHA Classification: I  ECG reviewed  Rhythm: regular  Rate: normal           Beta Blocker:  Not on Beta Blocker         Neuro/Psych:   Negative Neuro/Psych ROS     (-) seizures, CVA and depression/anxiety            GI/Hepatic/Renal: Neg GI/Hepatic/Renal ROS       (-) hiatal hernia and GERD       Endo/Other: Negative Endo/Other ROS             Pt had PAT visit. ROS comment: Osteoporosis  Abdominal:       Abdomen: soft. Vascular: Other Findings:             Anesthesia Plan      general     ASA 2     (Iv zofran within 30 min of closing )  Induction: intravenous. BIS  MIPS: Postoperative opioids intended and Prophylactic antiemetics administered. Anesthetic plan and risks discussed with patient. Use of blood products discussed with patient whom. Plan discussed with CRNA.     Attending anesthesiologist reviewed and agrees with Pre Eval content              Kiki Severe, MD   4/17/2022

## 2022-04-17 NOTE — OP NOTE
SURGICAL DEPARTMENT REPORT    SURGEON:   Yani London DO    DATE OF SERVICE: 4/17/2022    PREOPERATIVE DIAGNOSIS  Acute Appendicitis    POSTOPERATIVE DIAGNOSIS  Acute Appendicitis    PROCEDURE  Laparoscopic Appendectomy    FINDINGS:  Acutely inflamed appendix densely adhered posterior to the ovary in the right pelvic fossa. Significant amount of inflammatory fluid causing inflammation of the small bowel. INDICATION  Ms. Radha Avery is a 55 y.o. female previously in good health. About 2   ago, she began with right lower abdominal pain which worsened sending him to the ED, where CT findings showed suspected acute appendicitis. DESCRIPTION OF PROCEDURE  Ms. Radha Avery was taken to the main operating room and placed in the supine position. After general anesthesia was administered, the abdomen was prepped and draped in the usual, sterile fashion. A moe catheter was placed. A timeout was performed identifying the correct patient, procedure, preoperative antibiotics, and necessary available equipment. A small incision was made in the LUQ after instilling local anesthetic. The abdomen was entered using a 0 degree scope and a 5mm fios trocar under direct visualization. Pneumoperitoneum was achieved. A second 5mm port was placed in the LLQ. A 12mm port placed in the suprapubic region. A 30 degree 5mm laparoscope was inserted and inspection undertaken. No injury from insertion was appreciated. In the right lower quadrant no purulence was seen and the appendix was not initially visible. The patient was then rotated to the left and placed in reverse Trendelenburg position and the appendix was visualized. It appeared to be adhered in the right pelvis with significant seropurulent fluid in the pelvis. Blunt and electrocautery dissection was used to free the appendix from lateral attachments. The mesoappendix was freed from the appendix and divided with a cautery.   Further dissection was performed to reveal the base of the appendix at the level of the cecum. The appendix was transected with endo-TOMMIE blue load. Hemostasis was obtained. The appendix was then placed in a endoscopic retrieval bag and removed from the umbilical incision. The abdomen was again visualized with 5mm scope, and hemostasis remained at the staple lines. The pelvis was then copiously irrigated until the fluid was suctioned clear. The abdomen was desuflatted and all skin incisions were closed with 0000-Monocryl. Skin glue placed over all incisions. Sponge, needle, and instrument count correct on 2 occasions. Estimated intraoperative blood loss: Minimal.    Ms. Barrera Chi tolerated her surgery well, and she was taken to   PACU in satisfactory condition.          ________________________________  Trevor Woodard, DO

## 2022-04-17 NOTE — ED PROVIDER NOTES
[facility]  eMERGENCY dEPARTMENT eNCOUnter      Pt Name: Jeremías Vargas  MRN: [id number]  [place] 1965  Date of evaluation: 4/16/2022  Provider: NICOLE Giles    CHIEF COMPLAINT       Chief Complaint   Patient presents with    Abdominal Pain     lower abdomen, Started last night; intermittent pain, progressively worsened within the last few hours          HISTORY OF PRESENT ILLNESS   (Location/Symptom, Timing/Onset,Context/Setting, Quality, Duration, Modifying Factors, Severity)  Note limiting factors. Jeremías Vargas is a 55 y.o. female who presents to the emergency department with lower abd pain that started last night and has worsened. + fever  Was 100.6 this am.  Has taken tylenol and ibuprofen throughout the day. No diarrhea or vomiting.  + constipation. NO dysuria or frequency    The history is provided by the patient. Abdominal Pain  Pain location:  Suprapubic  Pain quality: aching and cramping    Pain radiates to:  Does not radiate  Pain severity:  Moderate  Onset quality:  Sudden  Duration:  2 days  Timing:  Constant  Progression:  Worsening  Chronicity:  New  Associated symptoms: chills and constipation    Associated symptoms: no diarrhea, no dysuria and no vomiting        NursingNotes were reviewed. REVIEW OF SYSTEMS    (2-9 systems for level 4, 10 or more for level 5)     Review of Systems   Constitutional: Positive for chills. Gastrointestinal: Positive for abdominal pain and constipation. Negative for diarrhea and vomiting. Genitourinary: Negative for dysuria. Except as noted above the remainder of the review of systems was reviewed and negative.        PAST MEDICAL HISTORY     Past Medical History:   Diagnosis Date    Nosebleed     Osteoporosis     Thyroid nodule          SURGICALHISTORY       Past Surgical History:   Procedure Laterality Date    BREAST CYST ASPIRATION Right 2007    BREAST CYST ASPIRATION  2016    BREAST CYST ASPIRATION  2015    COLONOSCOPY N/A 2/10/2020    Dr Lou Lema, 5 yr recall    DILATION AND CURETTAGE OF UTERUS  1991    CT COLONOSCOPY FLX DX W/COLLJ [address] WHEN PFRMD N/A 12/8/2016    Dr Harrison Ryan bleeding small to moderated sized internal hemorrhoids, tubulovillous AP, (- ) dysplasia--3 yr recall   [facility] Left 2016         CURRENT MEDICATIONS       Current Discharge Medication List      CONTINUE these medications which have NOT CHANGED    Details   alendronate (FOSAMAX) 70 MG tablet       conjugated estrogens (PREMARIN) 0.625 MG/GM vaginal cream Place 0.5 g vaginally Twice a Week Use nightly at bedtime for 2 weeks, then twice weekly at bedtime  Qty: 30 g, Refills: 3      Cholecalciferol (VITAMIN D) 2000 units CAPS capsule Take by mouth      Calcium Carb-Cholecalciferol (CALCIUM-VITAMIN D) 500-400 MG-UNIT TABS 2 tabs daily  Qty: 180 tablet, Refills: 5             ALLERGIES     Patient has no known allergies.     FAMILY HISTORY       Family History   Problem Relation Age of Onset    Heart Disease Paternal Grandfather     High Cholesterol Paternal Grandmother     Breast Cancer Maternal Grandmother [de-identified]    High Blood Pressure Father     High Cholesterol Father     Prostate Cancer Father     High Blood Pressure Mother     High Cholesterol Mother     Immunodeficiency Mother         low IGG          SOCIAL HISTORY       Social History     Socioeconomic History    Marital status:      Spouse name: Not on file    Number of children: Not on file    Years of education: Not on file    Highest education level: Not on file   Occupational History    Not on file   Tobacco Use    Smoking status: Never Smoker    Smokeless tobacco: Never Used   Vaping Use    Vaping Use: Never used   Substance and Sexual Activity    Alcohol use: Yes     Comment: occasionally    Drug use: No    Sexual activity: Yes   Other Topics Concern    Not on file   Social History Narrative    Not on file     Social Determinants of Health     Financial Resource Strain:     Difficulty of Paying Living Expenses: Not on file   Food Insecurity:     Worried About Running Out of Food in the Last Year: Not on file    Lb of Food in the Last Year: Not on file   Transportation Needs:     Lack of Transportation (Medical): Not on file    Lack of Transportation (Non-Medical): Not on file   Physical Activity:     Days of Exercise per Week: Not on file    Minutes of Exercise per Session: Not on file   Stress:     Feeling of Stress : Not on file   Social Connections:     Frequency of Communication with Friends and Family: Not on file    Frequency of Social Gatherings with Friends and Family: Not on file    Attends [employer] Services: Not on file    Active Member of [address] [place] [address] [employer] or Organizations: Not on file    Attends Club or Organization Meetings: Not on file    Marital Status: Not on file   Intimate Partner Violence:     Fear of Current or Ex-Partner: Not on file    Emotionally Abused: Not on file    Physically Abused: Not on file    Sexually Abused: Not on file   Housing Stability:     Unable to Pay for Housing in the Last Year: Not on file    Number of [place] in the Last Year: Not on file    Unstable Housing in the Last Year: Not on file       SCREENINGS    Maria E Coma Scale  Eye Opening: Spontaneous  Best Verbal Response: Oriented  Best Motor Response: Obeys commands  [place] Coma Scale Score: 15 @FLOW(27156194)@      PHYSICAL EXAM    (up to 7 for level 4, 8 or more for level 5)     ED Triage Vitals [04/16/22 1851]   BP Temp Temp src Pulse Resp SpO2 Height Weight   108/63 98.2 °F (36.8 °C) -- 98 18 97 % 5' 6\" (1.676 m) 130 lb (59 kg)       Physical Exam  Vitals and nursing note reviewed. Constitutional:       Appearance: She is well-developed. She is ill-appearing. Comments: chills   HENT:      Head: Normocephalic and atraumatic. Eyes:      General: No scleral icterus. Right eye: No discharge.          Left eye: No discharge. Cardiovascular:      Rate and Rhythm: Normal rate and regular rhythm. Heart sounds: Normal heart sounds. Pulmonary:      Effort: No respiratory distress. Abdominal:      General: Abdomen is flat. Bowel sounds are normal.      Palpations: Abdomen is soft. Tenderness: There is abdominal tenderness in the right lower quadrant, suprapubic area and left lower quadrant. There is no guarding or rebound. Hernia: No hernia is present. Musculoskeletal:      Cervical back: Normal range of motion and neck supple. Skin:     General: Skin is warm and dry. Neurological:      Mental Status: She is alert. Psychiatric:         Behavior: Behavior normal.         DIAGNOSTIC RESULTS     EKG: All EKG's are interpreted by the Emergency Department Physician who either signs or Co-signsthis chart in the absence of a cardiologist.        RADIOLOGY:   Inga Andrae such as CT, Ultrasound and MRI are read by the radiologist. Plain radiographic images are visualized and preliminarily interpreted by the emergency physician with the below findings:      Interpretation per the Radiologist below, if available at the time of this note:    CT ABDOMEN PELVIS W IV CONTRAST Additional Contrast? None   Final Result   1. Multiple appendicoliths are present in the appendix. The appendix   is at the upper limits of normal. There is no stranding around the   appendix. Early appendicitis cannot be excluded. .   2. Trace amount of fluid is present in the cul-de-sac.     Signed by Dr Riki Sanders            ED BEDSIDEULTRASOUND:   Performed by ED Physician -none    LABS:  Labs Reviewed   CBC WITH AUTO DIFFERENTIAL - Abnormal; Notable for the following components:       Result Value    WBC 11.0 (*)     MPV 9.1 (*)     Neutrophils % 90.9 (*)     Lymphocytes % 4.9 (*)     Neutrophils Absolute 10.0 (*)     Lymphocytes Absolute 0.5 (*)     All other components within normal limits   URINALYSIS WITH REFLEX TO CULTURE - Abnormal; Notable for the following components:    Clarity, UA CLOUDY (*)     Ketones, Urine 40 (*)     Blood, Urine MODERATE (*)     Protein, UA 30 (*)     Leukocyte Esterase, Urine MODERATE (*)     All other components within normal limits   COMPREHENSIVE METABOLIC PANEL W/ REFLEX TO MG FOR LOW K - Abnormal; Notable for the following components:    Glucose 123 (*)     AST 34 (*)     All other components within normal limits   MICROSCOPIC URINALYSIS - Abnormal; Notable for the following components:    Mucus, UA Rare (*)     RBC, UA 6-10 (*)     Bacteria, UA Rare (*)     All other components within normal limits   HCG, SERUM, QUALITATIVE   LIPASE       All other labs were within normal range or not returned as of this dictation. EMERGENCY DEPARTMENT COURSE and DIFFERENTIALDIAGNOSIS/MDM:   Vitals:    Vitals:    04/16/22 1851 04/16/22 1945 04/16/22 2154   BP: 108/63  103/61   Pulse: 98  87   Resp: 18  18   Temp: 98.2 °F (36.8 °C) 98.7 °F (37.1 °C) 99.7 °F (37.6 °C)   TempSrc:  Oral Temporal   SpO2: 97%  95%   Weight: 130 lb (59 kg)  128 lb 11.2 oz (58.4 kg)   Height: 5' 6\" (1.676 m)  5' 6\" (1.676 m)           MDMspoke to DR Alireza Monet who accepted pt for admission. WIll cover a possible UTI with rocephin      CONSULTS:  None    PROCEDURES:  Unless otherwise noted below, none     Procedures    FINAL IMPRESSION      1. Lower abdominal pain        DISPOSITION/PLAN   DISPOSITION Decision To Admit 04/16/2022 09:01:56 PM      PATIENT REFERRED TO:  No follow-up provider specified.     DISCHARGE MEDICATIONS:  Current Discharge Medication List             (Please note that portions of this note were completed with a voice recognitionprogram.  Efforts were made to edit the dictations but occasionally words are mis-transcribed.)    NICOLE Patrick (electronically signed)          NICOLE Patrick  04/16/22 8148

## 2022-04-18 VITALS
BODY MASS INDEX: 20.68 KG/M2 | DIASTOLIC BLOOD PRESSURE: 77 MMHG | SYSTOLIC BLOOD PRESSURE: 132 MMHG | HEART RATE: 86 BPM | RESPIRATION RATE: 18 BRPM | TEMPERATURE: 101.1 F | WEIGHT: 128.7 LBS | HEIGHT: 66 IN | OXYGEN SATURATION: 97 %

## 2022-04-18 PROCEDURE — 6370000000 HC RX 637 (ALT 250 FOR IP): Performed by: SURGERY

## 2022-04-18 PROCEDURE — G0378 HOSPITAL OBSERVATION PER HR: HCPCS

## 2022-04-18 PROCEDURE — 99024 POSTOP FOLLOW-UP VISIT: CPT | Performed by: SURGERY

## 2022-04-18 RX ORDER — PROMETHAZINE HYDROCHLORIDE 12.5 MG/1
12.5 TABLET ORAL ONCE
Status: COMPLETED | OUTPATIENT
Start: 2022-04-18 | End: 2022-04-18

## 2022-04-18 RX ORDER — AMOXICILLIN AND CLAVULANATE POTASSIUM 875; 125 MG/1; MG/1
1 TABLET, FILM COATED ORAL 2 TIMES DAILY
Qty: 14 TABLET | Refills: 0 | Status: SHIPPED | OUTPATIENT
Start: 2022-04-18 | End: 2022-04-25

## 2022-04-18 RX ORDER — HYDROCODONE BITARTRATE AND ACETAMINOPHEN 5; 325 MG/1; MG/1
1 TABLET ORAL EVERY 4 HOURS PRN
Qty: 12 TABLET | Refills: 0 | Status: SHIPPED | OUTPATIENT
Start: 2022-04-18 | End: 2022-04-21

## 2022-04-18 RX ADMIN — PROMETHAZINE HYDROCHLORIDE 12.5 MG: 12.5 TABLET ORAL at 09:33

## 2022-04-18 RX ADMIN — OXYCODONE HYDROCHLORIDE AND ACETAMINOPHEN 1 TABLET: 5; 325 TABLET ORAL at 02:06

## 2022-04-18 ASSESSMENT — PAIN SCALES - GENERAL
PAINLEVEL_OUTOF10: 4
PAINLEVEL_OUTOF10: 0

## 2022-04-18 ASSESSMENT — PAIN DESCRIPTION - LOCATION: LOCATION: ABDOMEN

## 2022-04-18 ASSESSMENT — PAIN DESCRIPTION - PAIN TYPE: TYPE: ACUTE PAIN;SURGICAL PAIN

## 2022-04-18 NOTE — PROGRESS NOTES
Physician Discharge Summary     Patient ID:  Forest Norwood  [id number]  8 y.o. Admit date: 4/16/2022    Discharge date and time: No discharge date for patient encounter. Admitting Physician: Trevor Woodard DO     Admission Diagnoses: Lower abdominal pain [R10.30]  Abdominal pain [R10.9]    Discharge Diagnoses:   Patient Active Problem List   Diagnosis    Breast lump    Fibrocystic breast disease    Dense breasts    Colon polyps    Osteopenia    Osteoporosis of multiple sites    Vitamin D deficiency    Epistaxis    Bilateral impacted cerumen    Abdominal pain    Acute appendicitis       Discharged Condition: good    Hospital Course: Patient admitted with abdominal pain and concern for acute appendicitis. Underwent laparoscopic appendectomy. Postop day 1 she was doing well and tolerating diet. She was discharged home in stable condition. Consults: none    Significant Diagnostic Studies:    CBC:  Recent Labs     04/16/22  1930   WBC 11.0*   RBC 4.90   HGB 14.7   HCT 44.5   MCV 90.8   MCH 30.0   MCHC 33.0   RDW 11.9      MPV 9.1*        Imaging as per medical record. Disposition: home    Patient Instructions: Activity: no lifting,or Strenuous exercise for 3 weeks  Diet: regular diet  Wound Care: keep wound clean and dry    Follow-up with Dr Rajiv Block in 2 weeks.     Signed:  Trevor Woodard DO  4/18/2022  8:05 AM

## 2022-04-18 NOTE — PROGRESS NOTES
CLINICAL PHARMACY NOTE: MEDS TO BEDS    Total # of Prescriptions Filled: 2   The following medications were delivered to the patient:  · Augmentin 875/125 mg  · Norco 5/325 mg    Additional Documentation:    Handed scripts to patients  and patient was alert as well

## 2022-04-18 NOTE — DISCHARGE SUMMARY
Physician Discharge Summary     Patient ID:  Sheilda Prader  [id number]  28 y.o. Admit date: 4/16/2022    Discharge date and time: 4/18/2022  2:27 PM     Admitting Physician: Simi Chacon DO     Admission Diagnoses: Lower abdominal pain [R10.30]  Abdominal pain [R10.9]    Discharge Diagnoses:   Patient Active Problem List   Diagnosis    Breast lump    Fibrocystic breast disease    Dense breasts    Colon polyps    Osteopenia    Osteoporosis of multiple sites    Vitamin D deficiency    Epistaxis    Bilateral impacted cerumen    Abdominal pain    Acute appendicitis       Discharged Condition: good    Hospital Course: Patient admitted with abdominal pain and concern for acute appendicitis. Underwent laparoscopic appendectomy. Postop day 1 she was doing well and tolerating diet. She was discharged home in stable condition. Consults: none    Significant Diagnostic Studies:    CBC:  Recent Labs     04/16/22  1930   WBC 11.0*   RBC 4.90   HGB 14.7   HCT 44.5   MCV 90.8   MCH 30.0   MCHC 33.0   RDW 11.9      MPV 9.1*        Imaging as per medical record. Disposition: home    Patient Instructions: Activity: no lifting,or Strenuous exercise for 3 weeks  Diet: regular diet  Wound Care: keep wound clean and dry    Follow-up with Dr Yoon Gordillo in 2 weeks.     Signed:  Simi Chacon DO  4/18/2022  3:54 PM

## 2022-04-18 NOTE — DISCHARGE INSTR - DIET

## 2022-04-20 NOTE — TELEPHONE ENCOUNTER
4/20/22 Patient states that after Appendectomy Laparoscopic she was put on Augmenton antibiotic and it is giving her really bad diarrhea. She wants to know if she can stop taking antibiotic. Please call patient at [phone]. She stated it was fine to leave a message.  KENDRA    CC: Yina Issa

## 2022-04-21 ENCOUNTER — HOSPITAL ENCOUNTER (EMERGENCY)
Age: 57
Discharge: HOME | End: 2022-04-21
Attending: EMERGENCY MEDICINE
Payer: COMMERCIAL

## 2022-04-21 ENCOUNTER — APPOINTMENT (OUTPATIENT)
Dept: CT IMAGING | Age: 57
End: 2022-04-21
Payer: COMMERCIAL

## 2022-04-21 VITALS
TEMPERATURE: 99.4 F | BODY MASS INDEX: 20.89 KG/M2 | HEIGHT: 66 IN | DIASTOLIC BLOOD PRESSURE: 71 MMHG | RESPIRATION RATE: 18 BRPM | OXYGEN SATURATION: 95 % | WEIGHT: 130 LBS | SYSTOLIC BLOOD PRESSURE: 139 MMHG | HEART RATE: 83 BPM

## 2022-04-21 DIAGNOSIS — K56.7 ILEUS, POSTOPERATIVE (HCC): Primary | ICD-10-CM

## 2022-04-21 DIAGNOSIS — K91.89 ILEUS, POSTOPERATIVE (HCC): Primary | ICD-10-CM

## 2022-04-21 LAB
ALBUMIN SERPL-MCNC: 3.9 G/DL (ref 3.5–5.2)
ALP BLD-CCNC: 191 U/L (ref 35–104)
ALT SERPL-CCNC: 72 U/L (ref 5–33)
ANION GAP SERPL CALCULATED.3IONS-SCNC: 11 MMOL/L (ref 7–19)
AST SERPL-CCNC: 70 U/L (ref 5–32)
BASOPHILS ABSOLUTE: 0 K/UL (ref 0–0.2)
BASOPHILS RELATIVE PERCENT: 0 % (ref 0–1)
BILIRUB SERPL-MCNC: 0.3 MG/DL (ref 0.2–1.2)
BILIRUBIN URINE: NEGATIVE
BLOOD, URINE: NEGATIVE
BUN BLDV-MCNC: 8 MG/DL (ref 6–20)
CALCIUM SERPL-MCNC: 9.7 MG/DL (ref 8.6–10)
CHLORIDE BLD-SCNC: 101 MMOL/L (ref 98–111)
CLARITY: CLEAR
CO2: 28 MMOL/L (ref 22–29)
COLOR: YELLOW
CREAT SERPL-MCNC: 0.5 MG/DL (ref 0.5–0.9)
EOSINOPHILS ABSOLUTE: 0.18 K/UL (ref 0–0.6)
EOSINOPHILS RELATIVE PERCENT: 2 % (ref 0–5)
GFR AFRICAN AMERICAN: >59
GFR NON-AFRICAN AMERICAN: >60
GLUCOSE BLD-MCNC: 116 MG/DL (ref 74–109)
GLUCOSE URINE: NEGATIVE MG/DL
HCT VFR BLD CALC: 42.8 % (ref 37–47)
HEMOGLOBIN: 14 G/DL (ref 12–16)
IMMATURE GRANULOCYTES #: 0.1 K/UL
KETONES, URINE: NEGATIVE MG/DL
LEUKOCYTE ESTERASE, URINE: NEGATIVE
LIPASE: 24 U/L (ref 13–60)
LYMPHOCYTES ABSOLUTE: 1.2 K/UL (ref 1.1–4.5)
LYMPHOCYTES RELATIVE PERCENT: 13 % (ref 20–40)
MCH RBC QN AUTO: 30 PG (ref 27–31)
MCHC RBC AUTO-ENTMCNC: 32.7 G/DL (ref 33–37)
MCV RBC AUTO: 91.8 FL (ref 81–99)
MONOCYTES ABSOLUTE: 0.5 K/UL (ref 0–0.9)
MONOCYTES RELATIVE PERCENT: 6 % (ref 0–10)
NEUTROPHILS ABSOLUTE: 7.1 K/UL (ref 1.5–7.5)
NEUTROPHILS RELATIVE PERCENT: 79 % (ref 50–65)
NITRITE, URINE: NEGATIVE
PDW BLD-RTO: 12 % (ref 11.5–14.5)
PH UA: 7.5 (ref 5–8)
PLATELET # BLD: 376 K/UL (ref 130–400)
PLATELET SLIDE REVIEW: ADEQUATE
PMV BLD AUTO: 8.8 FL (ref 9.4–12.3)
POTASSIUM SERPL-SCNC: 3.7 MMOL/L (ref 3.5–5)
PROTEIN UA: NEGATIVE MG/DL
RBC # BLD: 4.66 M/UL (ref 4.2–5.4)
SODIUM BLD-SCNC: 140 MMOL/L (ref 136–145)
SPECIFIC GRAVITY UA: 1.04 (ref 1–1.03)
TOTAL PROTEIN: 6.8 G/DL (ref 6.6–8.7)
UROBILINOGEN, URINE: 0.2 E.U./DL
WBC # BLD: 9 K/UL (ref 4.8–10.8)

## 2022-04-21 PROCEDURE — 6360000004 HC RX CONTRAST MEDICATION: Performed by: EMERGENCY MEDICINE

## 2022-04-21 PROCEDURE — 83690 ASSAY OF LIPASE: CPT

## 2022-04-21 PROCEDURE — 99285 EMERGENCY DEPT VISIT HI MDM: CPT

## 2022-04-21 PROCEDURE — 85025 COMPLETE CBC W/AUTO DIFF WBC: CPT

## 2022-04-21 PROCEDURE — 80053 COMPREHEN METABOLIC PANEL: CPT

## 2022-04-21 PROCEDURE — 96361 HYDRATE IV INFUSION ADD-ON: CPT

## 2022-04-21 PROCEDURE — 6360000002 HC RX W HCPCS: Performed by: NURSE PRACTITIONER

## 2022-04-21 PROCEDURE — 2580000003 HC RX 258: Performed by: NURSE PRACTITIONER

## 2022-04-21 PROCEDURE — 81003 URINALYSIS AUTO W/O SCOPE: CPT

## 2022-04-21 PROCEDURE — 96375 TX/PRO/DX INJ NEW DRUG ADDON: CPT

## 2022-04-21 PROCEDURE — 36415 COLL VENOUS BLD VENIPUNCTURE: CPT

## 2022-04-21 PROCEDURE — 74177 CT ABD & PELVIS W/CONTRAST: CPT

## 2022-04-21 PROCEDURE — 96374 THER/PROPH/DIAG INJ IV PUSH: CPT

## 2022-04-21 RX ORDER — ONDANSETRON 2 MG/ML
4 INJECTION INTRAMUSCULAR; INTRAVENOUS ONCE
Status: COMPLETED | OUTPATIENT
Start: 2022-04-21 | End: 2022-04-21

## 2022-04-21 RX ORDER — ONDANSETRON 4 MG/1
4 TABLET, ORALLY DISINTEGRATING ORAL EVERY 8 HOURS PRN
Qty: 15 TABLET | Refills: 0 | Status: SHIPPED | OUTPATIENT
Start: 2022-04-21 | End: 2022-07-14

## 2022-04-21 RX ORDER — CIPROFLOXACIN 500 MG/1
500 TABLET, FILM COATED ORAL 2 TIMES DAILY
Qty: 10 TABLET | Refills: 0 | Status: SHIPPED | OUTPATIENT
Start: 2022-04-21 | End: 2022-04-26

## 2022-04-21 RX ORDER — 0.9 % SODIUM CHLORIDE 0.9 %
1000 INTRAVENOUS SOLUTION INTRAVENOUS ONCE
Status: COMPLETED | OUTPATIENT
Start: 2022-04-21 | End: 2022-04-21

## 2022-04-21 RX ORDER — HYDROMORPHONE HYDROCHLORIDE 1 MG/ML
0.5 INJECTION, SOLUTION INTRAMUSCULAR; INTRAVENOUS; SUBCUTANEOUS ONCE
Status: COMPLETED | OUTPATIENT
Start: 2022-04-21 | End: 2022-04-21

## 2022-04-21 RX ORDER — METRONIDAZOLE 500 MG/1
500 TABLET ORAL 3 TIMES DAILY
Qty: 15 TABLET | Refills: 0 | Status: SHIPPED | OUTPATIENT
Start: 2022-04-21 | End: 2022-04-26

## 2022-04-21 RX ADMIN — IOPAMIDOL 90 ML: 755 INJECTION, SOLUTION INTRAVENOUS at 16:58

## 2022-04-21 RX ADMIN — ONDANSETRON 4 MG: 2 INJECTION INTRAMUSCULAR; INTRAVENOUS at 18:05

## 2022-04-21 RX ADMIN — SODIUM CHLORIDE 1000 ML: 9 INJECTION, SOLUTION INTRAVENOUS at 16:49

## 2022-04-21 RX ADMIN — HYDROMORPHONE HYDROCHLORIDE 0.5 MG: 1 INJECTION, SOLUTION INTRAMUSCULAR; INTRAVENOUS; SUBCUTANEOUS at 18:05

## 2022-04-21 ASSESSMENT — ENCOUNTER SYMPTOMS
CONSTIPATION: 0
ABDOMINAL PAIN: 1
VOMITING: 0

## 2022-04-21 ASSESSMENT — PAIN DESCRIPTION - LOCATION
LOCATION: ABDOMEN
LOCATION: ABDOMEN

## 2022-04-21 ASSESSMENT — PAIN DESCRIPTION - DESCRIPTORS
DESCRIPTORS: CRAMPING;ACHING
DESCRIPTORS: THROBBING;CRAMPING

## 2022-04-21 ASSESSMENT — PAIN - FUNCTIONAL ASSESSMENT
PAIN_FUNCTIONAL_ASSESSMENT: ACTIVITIES ARE NOT PREVENTED
PAIN_FUNCTIONAL_ASSESSMENT: 0-10

## 2022-04-21 ASSESSMENT — PAIN SCALES - GENERAL
PAINLEVEL_OUTOF10: 7
PAINLEVEL_OUTOF10: 7

## 2022-04-21 ASSESSMENT — PAIN DESCRIPTION - ORIENTATION: ORIENTATION: LOWER

## 2022-04-21 NOTE — TELEPHONE ENCOUNTER
Patient called today with additional concerns after surgery  * does not feel she is progressing  * cramping while eating  * Pressure and pain  * low grade fever     Patient's family requested nurse call and discuss symptoms
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
## 2022-04-21 NOTE — ED PROVIDER NOTES
Attending Supervisory Note/Shared Visit   I have personally performed a face to face diagnostic evaluation on this patient. I have reviewed the mid-levels findings and agree. Ms. Ida Whitten is a very pleasant 81-year-old female who presents emergency department for abdominal pain. She had a appendectomy on April 17. The first couple of days feels like she was doing better but then feels like she is getting worse now. No appetite and reports low-grade fever up to 100. No vomiting. Did have some diarrhea when she was taking Augmentin postoperatively and she did stop that. Vital signs stable patient nontoxic abdomen soft with some mild lower abdominal tenderness laparoscopic incisions are well-healing with no erythema    White blood cell count is normal and CT scan with still residual inflammation and based on reviewing op note seems that surrounding structures were quite inflamed suspect this is likely still causing her some discomfort. Also possible ileus but no obstruction and no obvious abscess. Abigail Saravia discussed the case with Dr. Jatin Frost and they will follow up closely with her in the office. FINAL IMPRESSION      1.  [facility]leus, postoperative [facility]          Katie Veliz MD  Attending Emergency Physician        Kerri Olivas MD  04/21/22 [id number]

## 2022-04-21 NOTE — TELEPHONE ENCOUNTER
[place] Rod came back here to me asking me to inform Dr Mp Garcia of this, says patients called twice & saying she wonders if she needs to go to ED. Forwarding to Dr Mp Garcia.

## 2022-04-22 ENCOUNTER — TELEPHONE (OUTPATIENT)
Dept: SURGERY | Age: 57
End: 2022-04-22

## 2022-04-22 NOTE — TELEPHONE ENCOUNTER
Patient called in and stated she was seen in ED yesterday and found to have a ileus. ED requested a follow up appt be made for Monday, appt made and patient notified.

## 2022-05-03 ENCOUNTER — OFFICE VISIT (OUTPATIENT)
Dept: SURGERY | Age: 57
End: 2022-05-03

## 2022-05-03 VITALS
DIASTOLIC BLOOD PRESSURE: 66 MMHG | HEART RATE: 85 BPM | BODY MASS INDEX: 19.96 KG/M2 | WEIGHT: 124.2 LBS | OXYGEN SATURATION: 98 % | HEIGHT: 66 IN | SYSTOLIC BLOOD PRESSURE: 110 MMHG | TEMPERATURE: 97.9 F

## 2022-05-03 DIAGNOSIS — K35.30 ACUTE APPENDICITIS WITH LOCALIZED PERITONITIS, WITHOUT PERFORATION, ABSCESS, OR GANGRENE: Primary | ICD-10-CM

## 2022-05-03 PROCEDURE — 99024 POSTOP FOLLOW-UP VISIT: CPT | Performed by: SURGERY

## 2022-05-03 NOTE — PROGRESS NOTES
S: Ms. Acevedo comes in today for a post op visit, now 2 weeks post laparoscopic appendectomy. Initially post hospital DC she did has some abdominal distension. Was seen in the ER and noted to have a mild Ileus. This has now resolved. No fever or chills noted and her post op pain has completely resolved. Her appetite has returned to normal with no nausea or vomiting reported. Bowel and bladder habits have also returned to normal. No problem noted with her incisions and she has returned to normal activity. O: Abdomen is soft and non tender. No mass appreciated and bowel sounds are normal. Laparoscopy incisions are well healed. Pathology report:  Severe acute appendicitis with periappendicitis     A: Full recovery post laparoscopic appendectomy. P: 1) Continue with normal activity and usual diet. 2) Follow up prn.

## 2022-05-05 ENCOUNTER — TELEPHONE (OUTPATIENT)
Dept: SURGERY | Age: 57
End: 2022-05-05

## 2022-05-05 DIAGNOSIS — R19.7 DIARRHEA, UNSPECIFIED TYPE: ICD-10-CM

## 2022-05-05 DIAGNOSIS — R19.7 DIARRHEA, UNSPECIFIED TYPE: Primary | ICD-10-CM

## 2022-05-05 LAB — C DIFF TOXIN/ANTIGEN: ABNORMAL

## 2022-05-16 ENCOUNTER — HOSPITAL ENCOUNTER (OUTPATIENT)
Dept: GENERAL RADIOLOGY | Age: 57
Discharge: HOME | End: 2022-05-16
Payer: COMMERCIAL

## 2022-05-16 DIAGNOSIS — R60.0 EDEMA OF LEFT LOWER EXTREMITY: ICD-10-CM

## 2022-05-16 DIAGNOSIS — M79.605 LEFT LEG PAIN: ICD-10-CM

## 2022-05-16 DIAGNOSIS — M79.605 LEFT LEG PAIN: Primary | ICD-10-CM

## 2022-05-16 PROCEDURE — 93971 EXTREMITY STUDY: CPT

## 2022-05-25 ENCOUNTER — OFFICE VISIT (OUTPATIENT)
Dept: PRIMARY CARE CLINIC | Age: 57
End: 2022-05-25
Payer: COMMERCIAL

## 2022-05-25 VITALS
SYSTOLIC BLOOD PRESSURE: 120 MMHG | BODY MASS INDEX: 20.09 KG/M2 | OXYGEN SATURATION: 98 % | HEIGHT: 66 IN | TEMPERATURE: 99.3 F | HEART RATE: 78 BPM | WEIGHT: 125 LBS | DIASTOLIC BLOOD PRESSURE: 84 MMHG

## 2022-05-25 DIAGNOSIS — M81.0 OSTEOPOROSIS, UNSPECIFIED OSTEOPOROSIS TYPE, UNSPECIFIED PATHOLOGICAL FRACTURE PRESENCE: ICD-10-CM

## 2022-05-25 DIAGNOSIS — A49.8 CLOSTRIDIOIDES DIFFICILE INFECTION: Primary | ICD-10-CM

## 2022-05-25 PROCEDURE — 99214 OFFICE O/P EST MOD 30 MIN: CPT | Performed by: NURSE PRACTITIONER

## 2022-05-25 RX ORDER — DENOSUMAB 60 MG/ML
60 INJECTION SUBCUTANEOUS ONCE
Qty: 180 ML | Refills: 0 | Status: SHIPPED | OUTPATIENT
Start: 2022-05-25 | End: 2022-05-25

## 2022-05-25 RX ORDER — VANCOMYCIN HYDROCHLORIDE 125 MG/1
CAPSULE ORAL
Qty: 61 CAPSULE | Refills: 0 | Status: SHIPPED | OUTPATIENT
Start: 2022-05-25 | End: 2022-06-18

## 2022-05-25 SDOH — ECONOMIC STABILITY: FOOD INSECURITY: WITHIN THE PAST 12 MONTHS, YOU WORRIED THAT YOUR FOOD WOULD RUN OUT BEFORE YOU GOT MONEY TO BUY MORE.: NEVER TRUE

## 2022-05-25 SDOH — ECONOMIC STABILITY: FOOD INSECURITY: WITHIN THE PAST 12 MONTHS, THE FOOD YOU BOUGHT JUST DIDN'T LAST AND YOU DIDN'T HAVE MONEY TO GET MORE.: NEVER TRUE

## 2022-05-25 ASSESSMENT — ENCOUNTER SYMPTOMS
RHINORRHEA: 0
NAUSEA: 0
VOMITING: 0
BACK PAIN: 0
COLOR CHANGE: 0
VOICE CHANGE: 0
PHOTOPHOBIA: 0
COUGH: 0
SHORTNESS OF BREATH: 0

## 2022-05-25 ASSESSMENT — PATIENT HEALTH QUESTIONNAIRE - PHQ9
1. LITTLE INTEREST OR PLEASURE IN DOING THINGS: 0
2. FEELING DOWN, DEPRESSED OR HOPELESS: 0
SUM OF ALL RESPONSES TO PHQ QUESTIONS 1-9: 0
SUM OF ALL RESPONSES TO PHQ9 QUESTIONS 1 & 2: 0
SUM OF ALL RESPONSES TO PHQ QUESTIONS 1-9: 0

## 2022-05-25 ASSESSMENT — SOCIAL DETERMINANTS OF HEALTH (SDOH): HOW HARD IS IT FOR YOU TO PAY FOR THE VERY BASICS LIKE FOOD, HOUSING, MEDICAL CARE, AND HEATING?: NOT HARD AT ALL

## 2022-05-25 NOTE — PROGRESS NOTES
[address] [facility] CARE  [address] [place] [address]  [address] [address]  Dept: [phone]  Dept Fax: [phone]  Loc: [phone]    Ata Sanchez is a 55 y.o. female who presents today for her medical conditions/complaints as noted below. Ata Sanchez is c/o of GI Problem (Cdiff for aprox 05/01 flagyl 10 now re occurance ) and Osteoporosis        HPI:     HPI   Chief Complaint   Patient presents with    GI Problem     Cdiff for aprox 05/01 flagyl 10 now re occurance     Osteoporosis     Patient presents today for evaluation of c.diff that has been reoccuring for several weeks. She has completed 10 days of Flagyl. She complains of continued diarrhea. She has had intermittent fevers as well. She denies N/V and she is able to tolerate PO intake.     She was treated with fosamax for a few months but was advised to discontinue    Past Medical History:   Diagnosis Date    Nosebleed     Osteoporosis     Thyroid nodule       Past Surgical History:   Procedure Laterality Date    BREAST CYST ASPIRATION Right 2007    BREAST CYST ASPIRATION  2016    BREAST CYST ASPIRATION  2015    COLONOSCOPY N/A 2/10/2020    Dr Marcia Kevin, 5 yr recall    DILATION AND CURETTAGE OF UTERUS  1991    LAPAROSCOPIC APPENDECTOMY N/A 4/17/2022    APPENDECTOMY LAPAROSCOPIC performed by Sergio Alcazar DO at [facility] COLONOSCOPY FLX DX W/COLLJ SPEC WHEN PFRMD N/A 12/8/2016    Dr Adan Bowers bleeding small to moderated sized internal hemorrhoids, tubulovillous AP, (- ) dysplasia--3 yr recall    US BREAST FINE NEEDLE ASPIRATION Left 2016       Vitals 5/25/2022 5/3/2022 4/21/2022 4/21/2022 4/17/2022 6/12/8274   SYSTOLIC 673 359 - 569 - -   DIASTOLIC 84 66 - 71 - -   Site Left Upper Arm - - - - -   Position Sitting - - - - -   Pulse 78 85 - 83 - -   Temp 99.3 97.9 - 99.4 - -   Resp - - - 18 - -   SpO2 98 98 - 95 100 100   Weight 125 lb 124 lb 3.2 oz - 130 lb - -   Height 5' 6\" 5' 6\" - 5' 6\" - -   Body mass index 20.17 kg/m2 20.04 kg/m2 - 20.98 kg/m2 - -   Pain Level - - 7 7 - -   Some recent data might be hidden       Family History   Problem Relation Age of Onset    Heart Disease Paternal Grandfather     High Cholesterol Paternal Grandmother     Breast Cancer Maternal Grandmother [de-identified]    High Blood Pressure Father     High Cholesterol Father     Prostate Cancer Father     High Blood Pressure Mother     High Cholesterol Mother     Immunodeficiency Mother         low IGG       Social History     Tobacco Use    Smoking status: Never Smoker    Smokeless tobacco: Never Used   Substance Use Topics    Alcohol use: Yes     Comment: occasionally      Current Outpatient Medications on File Prior to Visit   Medication Sig Dispense Refill    conjugated estrogens (PREMARIN) 0.625 MG/GM vaginal cream Place 0.5 g vaginally Twice a Week Use nightly at bedtime for 2 weeks, then twice weekly at bedtime 30 g 3    Calcium Carb-Cholecalciferol (CALCIUM-VITAMIN D) 500-400 MG-UNIT TABS 2 tabs daily 180 tablet 5    ondansetron (ZOFRAN ODT) 4 MG disintegrating tablet Take 1 tablet by mouth every 8 hours as needed for Nausea or Vomiting (Patient not taking: Reported on 5/3/2022) 15 tablet 0    alendronate (FOSAMAX) 70 MG tablet  (Patient not taking: Reported on 5/3/2022)      Cholecalciferol (VITAMIN D) 2000 units CAPS capsule Take by mouth       No current facility-administered medications on file prior to visit.      Allergies   Allergen Reactions    Percocet [Oxycodone-Acetaminophen]        Health Maintenance   Topic Date Due    HIV screen  Never done    Hepatitis C screen  Never done    COVID-19 Vaccine (3 - Booster for Pfizer series) 06/08/2021    Flu vaccine (Season Ended) 09/01/2022    Depression Screen  05/25/2023    Breast cancer screen  06/11/2023    Colorectal Cancer Screen  02/10/2025    Cervical cancer screen  04/01/2027    Lipids  04/12/2027    DTaP/Tdap/Td vaccine (4 - Td or Tdap) 11/06/2028    Shingles vaccine  Completed    Hepatitis A vaccine  Aged Out    Hepatitis B vaccine  Aged Out    Hib vaccine  Aged Out    Meningococcal (ACWY) vaccine  Aged Out    Pneumococcal 0-64 years Vaccine  Aged Out       Subjective:      Review of Systems   Constitutional: Negative for chills and fever. HENT: Negative for ear pain, hearing loss, rhinorrhea and voice change. Eyes: Negative for photophobia and visual disturbance. Respiratory: Negative for cough and shortness of breath. Cardiovascular: Negative for chest pain and palpitations. Gastrointestinal: Negative for nausea and vomiting. Endocrine: Negative. Negative for cold intolerance and heat intolerance. Genitourinary: Negative for difficulty urinating and flank pain. Musculoskeletal: Negative for back pain and neck pain. Skin: Negative for color change and rash. Allergic/Immunologic: Negative for environmental allergies and food allergies. Neurological: Negative for dizziness, speech difficulty and headaches. Hematological: Does not bruise/bleed easily. Psychiatric/Behavioral: Negative for sleep disturbance and suicidal ideas. Objective:     Physical Exam  Constitutional:       Appearance: She is well-developed. HENT:      Head: Atraumatic. Right Ear: External ear normal.      Left Ear: External ear normal.      Nose: Nose normal.   Eyes:      Conjunctiva/sclera: Conjunctivae normal.      Pupils: Pupils are equal, round, and reactive to light. Cardiovascular:      Rate and Rhythm: Normal rate and regular rhythm. Heart sounds: Normal heart sounds, S1 normal and S2 normal.   Pulmonary:      Effort: Pulmonary effort is normal.      Breath sounds: Normal breath sounds. Abdominal:      General: Bowel sounds are normal.      Palpations: Abdomen is soft. Musculoskeletal:         General: Normal range of motion. Cervical back: Normal range of motion and neck supple.    Skin:     General: Skin is warm and dry. Neurological:      Mental Status: She is alert and oriented to person, place, and time. Psychiatric:         Behavior: Behavior normal.       /84 (Site: Left Upper Arm, Position: Sitting)   Pulse 78   Temp 99.3 °F (37.4 °C) (Temporal)   Ht 5' 6\" (1.676 m)   Wt 125 lb (56.7 kg)   LMP 11/01/2017 (Exact Date)   SpO2 98%   BMI 20.18 kg/m²     Assessment:       Diagnosis Orders   1. Clostridioides difficile infection     2. Osteoporosis, unspecified osteoporosis type, unspecified pathological fracture presence           Plan:   More than 50% of the time was spent counseling and coordinating care for a total time of 30 min face to face. Begin prolia every 6 months. Vancomycin for c.diff. precautions given. Follow-up if not improving. PDMP Monitoring:    Last PDMP Carlos as Reviewed [facility]):  Review User Review Instant Review Result            Urine Drug Screenings (1 yr)    No resulted procedures found. Medication Contract and Consent for Opioid Use Documents Filed      No documents found                 Patient given educational materials -see patient instructions. Discussed use, benefit, and side effects of prescribed medications. All patient questions answered. Pt voiced understanding. Reviewed health maintenance. Instructed to continue currentmedications, diet and exercise. Patient agreed with treatment plan. Follow up as directed. MEDICATIONS:  Orders Placed This Encounter   Medications    Fidaxomicin (DIFICID) 200 MG TABS tablet     Sig: Take 200 mg by mouth 2 times daily     Dispense:  20 tablet     Refill:  0    denosumab (PROLIA) 60 MG/ML SOSY SC injection     Sig: Inject 1 mL into the skin once for 1 dose     Dispense:  180 mL     Refill:  0    vancomycin (VANCOCIN) 125 MG capsule     Sig: Take 1 capsule by mouth 4 times daily for 10 days, THEN 1 capsule in the morning and at bedtime for 7 days, THEN 1 capsule daily for 7 days.      Dispense:  61 capsule Refill:  0         ORDERS:  No orders of the defined types were placed in this encounter. Follow-up:  Return if symptoms worsen or fail to improve. PATIENT INSTRUCTIONS:  There are no Patient Instructions on file for this visit. Electronically signed by NICOLE Braun on 6/21/2022 at 6:32 PM    EMR Dragon/transcription disclaimer:  Much of thisencounter note is electronic transcription/translation of spoken language to printed texts. The electronic translation of spoken language may be erroneous, or at times, nonsensical words or phrases may be inadvertentlytranscribed.   Although I have reviewed the note for such errors, some may still exist.

## 2022-06-02 ENCOUNTER — TELEPHONE (OUTPATIENT)
Dept: OBGYN CLINIC | Age: 57
End: 2022-06-02

## 2022-06-02 NOTE — TELEPHONE ENCOUNTER
Please inform the patient I will contact the lab and have them resubmit this under the current insurance- AA

## 2022-06-11 ENCOUNTER — TRANSCRIBE ORDERS (OUTPATIENT)
Dept: ADMINISTRATIVE | Facility: HOSPITAL | Age: 57
End: 2022-06-11

## 2022-06-11 DIAGNOSIS — M25.562 LEFT KNEE PAIN, UNSPECIFIED CHRONICITY: Primary | ICD-10-CM

## 2022-06-15 ENCOUNTER — HOSPITAL ENCOUNTER (OUTPATIENT)
Dept: MRI IMAGING | Age: 57
Discharge: HOME | End: 2022-06-15
Payer: COMMERCIAL

## 2022-06-15 DIAGNOSIS — M25.562 ACUTE PAIN OF LEFT KNEE: ICD-10-CM

## 2022-06-15 PROCEDURE — 73721 MRI JNT OF LWR EXTRE W/O DYE: CPT | Performed by: RADIOLOGY

## 2022-06-15 PROCEDURE — 73721 MRI JNT OF LWR EXTRE W/O DYE: CPT

## 2022-06-22 ENCOUNTER — HOSPITAL ENCOUNTER (OUTPATIENT)
Dept: WOMENS IMAGING | Age: 57
Discharge: HOME | End: 2022-06-22
Payer: COMMERCIAL

## 2022-06-22 DIAGNOSIS — Z12.31 VISIT FOR SCREENING MAMMOGRAM: ICD-10-CM

## 2022-06-22 PROCEDURE — 77063 BREAST TOMOSYNTHESIS BI: CPT

## 2022-06-29 ENCOUNTER — OFFICE VISIT (OUTPATIENT)
Dept: SURGERY | Age: 57
End: 2022-06-29
Payer: COMMERCIAL

## 2022-06-29 VITALS — TEMPERATURE: 97.4 F | WEIGHT: 128 LBS | BODY MASS INDEX: 20.09 KG/M2 | HEIGHT: 67 IN

## 2022-06-29 DIAGNOSIS — Z12.31 VISIT FOR SCREENING MAMMOGRAM: Primary | ICD-10-CM

## 2022-06-29 PROCEDURE — 99213 OFFICE O/P EST LOW 20 MIN: CPT | Performed by: PHYSICIAN ASSISTANT

## 2022-06-29 NOTE — PROGRESS NOTES
HPI:  Richard Ospina is in for yearly follow-up breast check. She has not noticed any changes in her breasts. Her imaging was reviewed and is noted below. EXAMINATION: [facility] DIGITAL SCREEN W OR WO CAD BILATERAL 6/22/2022 9:55   PM   HISTORY: Screening   COMPARISON STUDIES: 6/11/2021, 6/2/2020, 4/26/2019, 4/25/2018,   4/26/2017, 4/24/2017, and 4/19/2016. FINDINGS:    2-D and 3-D digital mammography of bilateral breasts was obtained in   the CC and MLO projection. The breast tissue is heterogeneously dense (approximately 50-75%   glandular tissue), limiting the sensitivity of mammography. There are   scattered benign calcifications, mostly on the left. Right breast mole   markers. No suspicious masses, architectural distortion or new   suspicious microcalcifications are identified. This study was   interpreted with CAD. IMPRESSION AND RECOMMENDATION:    No mammographic evidence of malignancy. No significant interval   change. Recommendation is for the patient to return for routine   mammography in one year or sooner, if clinically indicated. BI-RADS CATEGORY 2: BENIGN FINDINGS       BREAST EXAM:  On examination, she has fibrocystic changes throughout both breasts, no dominant masses, no skin or nipple changes and no axillary adenopathy. I see nothing suspicious for breast cancer. ASSESSMENT:  Benign fibrocystic changes              PLAN:  Her mammogram has been stable for some time. She will have her breast exams and mammograms with her gyn. I will be happy to see her in the future if the need arises. She will contact me if anything significant changes. 20 minutes spent, which includes face to face with patient, record review, evaluation, planning, and education.

## 2022-07-06 ENCOUNTER — HOSPITAL ENCOUNTER (OUTPATIENT)
Dept: PHYSICAL THERAPY | Age: 57
Setting detail: THERAPIES SERIES
Discharge: HOME | End: 2022-07-06
Payer: COMMERCIAL

## 2022-07-06 PROCEDURE — 97162 PT EVAL MOD COMPLEX 30 MIN: CPT

## 2022-07-12 ENCOUNTER — HOSPITAL ENCOUNTER (OUTPATIENT)
Dept: PHYSICAL THERAPY | Age: 57
Setting detail: THERAPIES SERIES
Discharge: HOME | End: 2022-07-12
Payer: COMMERCIAL

## 2022-07-12 PROCEDURE — 97110 THERAPEUTIC EXERCISES: CPT

## 2022-07-12 ASSESSMENT — PAIN SCALES - GENERAL: PAINLEVEL_OUTOF10: 1

## 2022-07-12 ASSESSMENT — PAIN DESCRIPTION - DESCRIPTORS
DESCRIPTORS: ACHING;SORE
DESCRIPTORS: ACHING;SORE;TIGHTNESS

## 2022-07-12 ASSESSMENT — PAIN DESCRIPTION - PAIN TYPE
TYPE: ACUTE PAIN
TYPE: ACUTE PAIN

## 2022-07-12 ASSESSMENT — PAIN - FUNCTIONAL ASSESSMENT: PAIN_FUNCTIONAL_ASSESSMENT: PREVENTS OR INTERFERES SOME ACTIVE ACTIVITIES AND ADLS

## 2022-07-12 ASSESSMENT — PAIN DESCRIPTION - ORIENTATION
ORIENTATION: LEFT
ORIENTATION: LEFT

## 2022-07-12 ASSESSMENT — PAIN DESCRIPTION - LOCATION
LOCATION: KNEE
LOCATION: KNEE

## 2022-07-12 ASSESSMENT — PAIN DESCRIPTION - FREQUENCY: FREQUENCY: INTERMITTENT

## 2022-07-12 NOTE — PROGRESS NOTES
Physical Therapy: Initial Evaluation    Patient: Lewie Gottron (43 y.o. female)   Examination Date:   Plan of Care Certification Period: 2022 to 10/04/22      :  1965 ;    MRN: [id number]  CSN: [id number]   Insurance: Payor: [employer] / Plan: [address] [place] [address] Marks / Product Type: *No Product type* /   Insurance ID: [id number] - ([facility]) Secondary Insurance (if applicable):    Referring Physician: MD Wisam Tillman MD   PCP: NICOLE Nguyễn Visits to Date/Visits Approved:     No Show/Cancelled Appts:   /       Medical Diagnosis: Effusion, left knee [M25.462] L knee pain  Treatment Diagnosis: L knee pain     PERTINENT MEDICAL HISTORY   Patient Assessed for Rehabilitation Services: Yes       Medical History: Chart Reviewed: Yes     SUBJECTIVE EXAMINATION     History obtained from[de-identified] Chart Review,Patient,           Subjective History:    Subjective: Pt presents with complaint of L knee pain, present x 2-3 months. She denies any specific injury. States that she had appendectomy in April and dealt with bout of c-diff after. Her knee pain had started about the same time as this. Had MRI, which radiologist read as having MCL/ACL sprain, but Dr. Frann Paget stated that he believes this is not the case and believes that she has a meniscus tear. She is normally active, enjoys hiking, walking for exercise, etc, and has been unable to do so due to pain and swelling. Additional Pertinent Hx (if applicable): 54 y/o F presents with complaint of L knee pain.   PMH includes osteoporosis   Prior diagnostic testing[de-identified] MRI      Pain Screening   Pain Screening  Patient Currently in Pain: Yes  Pain Assessment: 0-10  Pain Type: Acute pain  Pain Location: Knee  Pain Orientation: Left  Pain Descriptors: Aching,Sore  Pain Frequency: Intermittent  Functional Pain Assessment: Prevents or interferes some active activities and ADLs    Functional Status Occupation/Interests:  Occupation: Full time employment  Type of Occupation: APRN at [employer]: Walking, hiking, generally active    Prior Level of Function:    Independent      Current Level of Function:         ADL Assistance: Independent  Homemaking Assistance: Independent  Ambulation Assistance: Independent  Active : Yes    OBJECTIVE EXAMINATION     Review of Systems:  Vision: Within Functional Limits  Hearing: Within functional limits  Overall Orientation Status: Within Normal Limits  Patient affect[de-identified] Normal  Follows Commands: Within Functional Limits    Observations:  General Observations  Description: Mild edema at L knee, watson-patellar area    Palpation:   Left Knee Palpation: Mild TTP at L medial joint line    Ambulation/Gait (if applicable):  Ambulation  Surface: level tile  Device: No Device  Assistance: Independent  Quality of Gait: Limited L knee extension and minimal L heel strike. Normal speed and step length. Left AROM  Right AROM         AROM LLE (degrees)  L Knee Flexion 0-145: 128 degrees  L Knee Extension 0: Lacking 8 degrees from full extension          Left Strength  Right Strength         Strength LLE  L Hip Flexion: 5/5 (5-/5)  L Hip Extension: 5/5 (5-/5)  L Hip ABduction: 5/5 (5-/5)  L Hip ADduction: 5/5  L Knee Flexion: 4+/5  L Knee Extension: 4+/5    Strength RLE  R Hip Flexion: 5/5 (5-/5)  R Hip Extension: 5/5  R Hip ABduction: 5/5  R Hip ADduction: 5/5  R Knee Flexion: 5/5 (5-/5)  R Knee Extension: 5/5     Special Tests:   Special Tests for Knee  Valgus Stress Test (MCL injury): L (-)  Varus Stress Test (LCL injury): L (-)  Ant. Drawer (ACL injury): L (-)  Lilian (Meniscus Lesion): L (+)    ASSESSMENT     Impression: Assessment: Pt presents with complaint of L knee pain and demonstrates decreased strength, ROM, and functional mobility. Will benefit from skilled PT intervention to address listed impairments.     Body Structures, Functions, Activity Limitations Requiring Skilled Therapeutic Intervention: Decreased functional mobility ,Decreased ROM,Decreased strength,Decreased high-level IADLs    Statement of Medical Necessity: Physical Therapy is both indicated and medically necessary as outlined in the POC to increase the likelihood of meeting the functionally related goals stated below. Patient's Activity Tolerance: Patient tolerated evaluation without incident      Patient's rehabilitation potential/prognosis is considered to be: Excellent    Factors which may impact rehabilitation potential include:          GOALS   Patient Goal(s): reduce L knee pain, return to PLOF  Short Term Goals Completed by 3-4 weeks Goal Status   Independent with HEP New   Improve L knee ROM to 0 degrees extension New   Perform 10 good quality L quad sets with good VMO activation New                                                 Long Term Goals Completed by 4-6 weeks Goal Status   Demonstrate equal knee extension and heel strike bilaterally during gait.  New   No pain with ascending/descending stairs New   Return to walking for exercise New   Improve LEFS score to at least 65/80 New                                            TREATMENT PLAN       Pt. actively involved in establishing Plan of Care and Goals: Yes  Patient/ Caregiver education and instruction: Deloris Ashton of Care,Evaluative findings             Treatment may include any combination of the followin Zulma De Anda re-education,Return to work related activity,Home exercise program,Patient/Caregiver education & training,Modalities     Frequency / Duration:  Patient to be seen 2x for 4-6 weeks weeks      Eval Complexity: Overall Evaluation : Medium  Decision Making: Medium Complexity    Therapy Time  Individual Time In: Valerie       Individual Time Out: Blayne 72  Minutes: 52        Therapist Signature: Esperanza Molina Lara Mtz, PT    Date: 7/12/2022

## 2022-07-12 NOTE — PROGRESS NOTES
Physical Therapy: Daily Note   Patient: Roy Siemens (89 y.o. female)   Examination Date:   Plan of Care/Certification Expiration Date: 10/04/22    No data recorded   :  1965 # of Visits since [facility]:   2   MRN: [id number]  CSN: [id number] Start of Care Date:   2022   Insurance: Payor: Kristel Do / Plan: [employer] [address] Marks / Product Type: *No Product type* /   Insurance ID: [id number] - ([employer]) Secondary Insurance (if applicable):    Referring Physician: MD Sanjiv Ambrosio MD   PCP: NICOLE Moncada Visits to Date/Visits Approved:     No Show/Cancelled Appts:   /       Medical Diagnosis: Effusion, left knee [M25.462] L knee pain  Treatment Diagnosis: L knee pain        SUBJECTIVE EXAMINATION   Pain Level: Pain Screening  Patient Currently in Pain: Yes  Pain Assessment: 0-10  Pain Level: 1  Pain Type: Acute pain  Pain Location: Knee  Pain Orientation: Left  Pain Descriptors: Aching,Sore,Tightness  Pain Frequency: Intermittent  Functional Pain Assessment: Prevents or interferes some active activities and ADLs    Patient Comments: Subjective: No new complaints. She has continued with quad strengthening, but does not particularly note any differences.     OBJECTIVE EXAMINATION   Restrictions:  No data recorded No data recorded No data recorded      TREATMENT     Exercises:      Treatment Reasoning    Exercise 1: Airdyne x 5'  Exercise 2: Quad sets/VMO quad sets on towel 3\" x 20 ea  Exercise 3: Heel slides x 10  Exercise 4: Knee extension stretch on towel x 2'  Exercise 5: SLR (neutral x 15, ER x 10, IR x 10)  Exercise 6: 3 way SLR (abd, ext, add) x 15 ea  Exercise 7: Prone HS Curls (neutral, ER, IR) x 15 ea  Exercise 8: SAQ x 15  Exercise 9: Bridging x 15/Single leg L x 15  Exercise 10: LAQ x 15/HS Curls x 15 (green)  Exercise 11: Leg press (ball b/w knees 4 plates x 14)/RSUKRX leg L x1 plate x 15  Exercise 12: TKE (green) x 15  Exercise 13: Wall sits (ball b/w knees) 4 x 15\"  Exercise 14: March on trampoline  Exercise 15: Step ups fwd/lateral/retro 6\" step x 10 ea  Exercise 16: Standing hip abd/ext x 10 ea  Exercise 17: Heel raises x 10 ea  Exercise 18: Partial alternating lunges in  bars x 10 ea  Exercise 19: Monster walk with t-band (yellow band at thigh level) down/back x 2  Exercise 20: Treadmill (focus on normal gait pattern)- not today (treadmill already in use)     Limitations addressed: Strength,Flexibility,Pain modulation,Mobility   Therapist provided: Verbal cuing   Progressed: Resistance,Repetitions,Complexity of movement   Functional ability(s) targeted: Ambulating community distances,Return to work tasks,Tolerance to age appropriate activities                   Pt Education: PT Education: Yong Evans of Care,Evaluative findings     ASSESSMENT     Assessment: Assessment: POC initiated per initial evaluation. Of note, pt demonstrates improvement in ROM, especially extension, and is now able to reach full extension. Good response to exercise routine, apart from pain with SAQ at full extension. Likely appropriate to begin adding resistance via ankle weights to exercises. Reported that end of session that her knee felt \"looser\" and she had no complaints. Body Structures, Functions, Activity Limitations Requiring Skilled Therapeutic Intervention: Decreased functional mobility ,Decreased ROM,Decreased strength,Decreased high-level IADLs    Post-Treatment Pain Level:       Activity Tolerance: Patient tolerated treatment well    Therapy Prognosis: Excellent       GOALS   Patient goals : reduce L knee pain, return to PLOF  Short Term Goals Completed by 3-4 weeks Current Status Goal Status   Independent with HEP   New   Improve L knee ROM to 0 degrees extension 0 degrees extension during tx today New,Met   Perform 10 good quality L quad sets with good VMO activation   New                                                               Long Term Goals

## 2022-07-14 ENCOUNTER — HOSPITAL ENCOUNTER (OUTPATIENT)
Dept: PHYSICAL THERAPY | Age: 57
Setting detail: THERAPIES SERIES
Discharge: HOME | End: 2022-07-14
Payer: COMMERCIAL

## 2022-07-14 PROCEDURE — 97110 THERAPEUTIC EXERCISES: CPT

## 2022-07-14 NOTE — PROGRESS NOTES
Physical Therapy: Daily Note   Patient: Shanon Barthel (18 y.o. female)   Examination Date:   Plan of Care/Certification Expiration Date: 10/04/22    No data recorded   :  1965 # of Visits since [facility]:   3   MRN: [id number]  CSN: [id number] Start of Care Date:   2022   Insurance: Payor: Evelin GRUBERandreshelad[address] / Plan: [facility] [address] / Product Type: *No Product type* /   Insurance ID: [id number] - ([facility]) Secondary Insurance (if applicable):    Referring Physician: MD King Alejandro MD   PCP: NICOLE Mckinley Visits to Date/Visits Approved: 3 / 12    No Show/Cancelled Appts:   /       Medical Diagnosis: Effusion, left knee [M25.462]    Treatment Diagnosis: L knee pain        SUBJECTIVE EXAMINATION   Pain Level:      Patient Comments: Subjective: Patient reports she has about 5/10 pain today. She says that she feels she had a set back after her last session. She says the swelling has been worse and it is stiff. OBJECTIVE EXAMINATION   Restrictions:  No data recorded No data recorded No data recorded        TREATMENT     Exercises:      Treatment Reasoning    Exercise 1: Airdyne x 5'        *HEP IN CHART  Exercise 2: Quad sets/VMO quad sets on towel 3\" x 20 ea  Exercise 3: Heel slides x 10  Exercise 4: Knee extension stretch on towel x 2'  Exercise 5: SLR (neutral x 15, ER x 10, IR x 10)  Exercise 6: 3 way SLR (abd, ext, add) x 15 ea  Exercise 7: Prone HS Curls (neutral, ER, IR) x 15 ea  Exercise 8: SAQ x 15  Exercise 9: Bridging x 15/Single leg L x 15- no single leg today  Exercise 10: LAQ x 15/HS Curls x 15 (green)  Exercise 11: Leg press (ball b/w knees 4 plates x 07)/MCKTTV leg L x1 plate x 15- not today  Exercise 12: TKE (green) x 15  Exercise 13: Wall sits (ball b/w knees) 4 x 15\"- not today  Exercise 14:  trampoline  Exercise 15:  Step ups fwd/lateral/retro 6\" step x 10 ea 4\" today due to increased pain in general no retro  Exercise 16: Standing hip abd/ext x 10 ea  Exercise 17: Heel raises x 10 ea  Exercise 18: Partial alternating lunges in  bars x 10 ea- not today  Exercise 19: Monster walk with t-band (yellow band at thigh level) down/back x 2- not today  Exercise 20: Treadmill (focus on normal gait pattern)- not today             ICE to left knee x 10 min   HEP issued 07/14/2022  Limitations addressed: Strength,Flexibility,Pain modulation,Mobility   Therapist provided: Verbal cuing   Progressed: Resistance,Repetitions,Complexity of movement   Functional ability(s) targeted: Ambulating community distances,Return to work tasks,Tolerance to age appropriate activities                       Pt Education:  HEP issued       ASSESSMENT     Assessment: Assessment: Patient did well with tx today with ability to complete all attempted ex's. Decreased ex's completed today due to increased pain and swelling after last session. She did have increased pain with saq and laq today, but she was able to tolerate. Advised patient to ice and elevate as able and general rom activity when seated at work when it feels stiff. Body Structures, Functions, Activity Limitations Requiring Skilled Therapeutic Intervention: Decreased functional mobility ,Decreased ROM,Decreased strength,Decreased high-level IADLs      Activity Tolerance: Patient tolerated treatment well    Therapy Prognosis: Excellent       GOALS   Patient goals : reduce L knee pain, return to PLOF  Short Term Goals Completed by 3-4 weeks Current Status Goal Status   Independent with HEP   New   Improve L knee ROM to 0 degrees extension 0 degrees extension during tx today New,Met   Perform 10 good quality L quad sets with good VMO activation   New                                                               Long Term Goals Completed by 4-6 weeks Current Status Goal Status   Demonstrate equal knee extension and heel strike bilaterally during gait.    New   No pain with ascending/descending stairs   New Return to walking for exercise   New   Improve LEFS score to at least 65/80   New                                                        TREATMENT PLAN   Plan Frequency: 2x  Plan weeks: 4-6 weeks  Current Treatment Recommendations: [address] re-education,Return to work related activity,Home exercise program,Patient/Caregiver education & training,Modalities           Therapy Time  Individual Time In: 0700       Individual Time Out: 4024  Minutes: 53  Timed Code Treatment Minutes: 43 Minutes     Electronically signed by Greer Jenkins PT  on 7/14/2022 at 9:24 AM   POC NOTE

## 2022-07-20 ENCOUNTER — HOSPITAL ENCOUNTER (OUTPATIENT)
Dept: PHYSICAL THERAPY | Age: 57
Setting detail: THERAPIES SERIES
Discharge: HOME | End: 2022-07-20
Payer: COMMERCIAL

## 2022-07-20 PROCEDURE — 97110 THERAPEUTIC EXERCISES: CPT

## 2022-07-20 NOTE — PROGRESS NOTES
Physical Therapy  Daily Treatment Note  Date: 2022  Patient Name: Jhonatan Aden  MRN: [id number]     :   1965    Referring Physician: Queenie Nice MD     PCP: NICOLE Bowens    Medical Diagnosis: Effusion, left knee [C03.599]    Treatment Diagnosis: L knee pain      Ins[facility]: [facility] / Plan: Gini Frankel [address] Marks / Product Type: *No Product type* /   Insurance ID: [id number] - ([facility])    Subjective:           Treatment Activities:  Exercises:      Treatment Reasoning    Exercise 1: Airdyne x 5'  -seat 6                       LEFT KNEE  Exercise 2: Quad sets/VMO quad sets on towel 3\" x 20 ea  Exercise 3: Heel slides x 20  Exercise 4: Knee extension stretch on towel x 2'  Exercise 5: SLR (neutral x 15, ER x 10, IR x 10)  Exercise 6: 3 way SLR (abd, ext, add) x 15 ea  Exercise 7: Prone HS Curls (neutral, ER, IR) x 15 ea  Exercise 8: SAQ x 15-increases pain  Exercise 9: Bridging x 15/Single leg L x 15- no single leg today  Exercise 10: LAQ x 20/HS Curls x 20 (green)  Exercise 11: Leg press (ball b/w knees 4 plates x 81)/EOOTGN leg L x1 plate x 15  Exercise 12: TKE (green) x 15  Exercise 13: Wall sits (ball b/w knees) 4 x 15\"- not today  Exercise 14: March on trampoline x 1 min  Exercise 15: Step ups fwd/lateral/retro 6\" step x 10 ea 4\" today due to increased pain and forward only  Exercise 16: Standing hip abd/ext x 15 ea  Exercise 17: Heel raises x 10 ea  Exercise 18: Partial alternating lunges in  bars x 10 ea- not today  Exercise 19:  Monster walk with t-band (yellow band at thigh level) down/back x 2- not today  Exercise 20: Treadmill (focus on normal gait pattern)- level x 5mins at 2.2 mph             ICE to left knee x 10 min       IF EDEMA CONTINUES MAY TRY KINESIOTAPE HORSESHOE TECH OR EDEMA CROSS PATTERN TECH-applied edema shelby cross pattern 2022                   -HEP  in CHART                           Assessment:   Conditions Requiring Skilled Therapeutic Intervention  Body Structures, Functions, Activity Limitations Requiring Skilled Therapeutic Intervention: Decreased functional mobility ; Decreased ROM; Decreased strength;Decreased high-level IADLs  Assessment: Patient did well with session. She continues to have slight discomfort with saq and continues to complain of swelling in left knee. Kinesiotape applied to left knee today in shelby cross pattern to help reduce edema. Patient instructed to remove tape if skin begins to itch or burn. Patient verbalized understanding. She reported pain at 1/10 pre and post session. But also said she felt fatigued but stronger. Treatment Diagnosis: L knee pain        Goals:  Short Term Goals  Time Frame for Short term goals: 3-4 weeks  Short term goal 1: Independent with HEP  STG Goal 1 Status[de-identified] New  Short term goal 2: Improve L knee ROM to 0 degrees extension  STG 2 Current Status[de-identified] 0 degrees extension during tx today  STG Goal 2 Status[de-identified] New, Met  Short term goal 3: Perform 10 good quality L quad sets with good VMO activation  STG Goal 3 Status[de-identified] New  Long Term Goals  Time Frame for Long term goals : 4-6 weeks  Long term goal 1: Demonstrate equal knee extension and heel strike bilaterally during gait.   LTG Goal 1 Status[de-identified] New  Long term goal 2: No pain with ascending/descending stairs  LTG Goal 2 Status[de-identified] New  Long term goal 3: Return to walking for exercise  LTG Goal 3 Status[de-identified] New  Long term goal 4: Improve LEFS score to at least 65/80  LTG Goal 4 Status[de-identified] New  Patient Goals   Patient goals : reduce L knee pain, return to PLOF    Plan:    Plan  Plan weeks: 4-6 weeks  Current Treatment Recommendations: Strengthening, ROM, Manual Therapy - Joint Manipulation, Manual Therapy - Soft Tissue Mobilization, Neuromuscular re-education, Return to work related activity, Home exercise program, Patient/Caregiver education & training, Modalities  Timed Code Treatment Minutes: 50 Minutes (plus 10 mins ice pack)     Therapy Time: Individual Concurrent Group Co-treatment   Time In 1600         Time Out 1700         Minutes 60         Timed Code Treatment Minutes: 50 Minutes (plus 10 mins ice pack)       Lily Lesch, PTA     Electronically signed by Lily Lesch, PTA on 7/20/2022 at 5:19 PM

## 2022-07-21 ENCOUNTER — HOSPITAL ENCOUNTER (OUTPATIENT)
Dept: PHYSICAL THERAPY | Age: 57
Setting detail: THERAPIES SERIES
Discharge: HOME | End: 2022-07-21
Payer: COMMERCIAL

## 2022-07-21 PROCEDURE — 97110 THERAPEUTIC EXERCISES: CPT

## 2022-07-21 NOTE — PROGRESS NOTES
Physical Therapy  Daily Treatment Note  Date: 2022  Patient Name: Sonali Randolph  MRN: [id number]     :   1965    Referring Physician: MD Brenda Kemp MD   PCP: NICOLE Ceja    Medical Diagnosis: Effusion, left knee [T05.325]    Treatment Diagnosis: L knee pain      Insurance: Payor: [employer] Ab / Plan: Montz Amelia PENRITH Marks / Product Type: *No Product type* /   Insurance ID: [id number] - ([address] [place] [address])    Subjective:   General  Referring Provider (secondary): Brenda Hatch MD  PT Visit Information  Total # of Visits Approved: 12  Total # of Visits to Date: 4  Plan of Care/Certification Expiration Date: 10/04/22  Progress Note Due Date: 22  Subjective  Subjective: I think the tape may have helped. Im not having any pain today just some mild tightness. Pain Screening  Patient Currently in Pain: No       Treatment Activities:  Exercises:      Treatment Reasoning    Exercise 1: Airdyne x 6'  -seat 6                       LEFT KNEE  Exercise 2: Quad sets/VMO quad sets on towel 3\" x 20 ea  Exercise 3: Heel slides x 20  Exercise 4: Knee extension stretch on towel x 2'  Exercise 5: SLR (neutral x 15, ER x 15, IR x 15)  Exercise 6: 3 way SLR (abd, ext, add) x 15 ea  Exercise 7: Prone HS Curls (neutral, ER, IR) x 15 ea  Exercise 8: SAQ x 15-increases pain  Exercise 9: Bridging x 15/Single leg L x 15  Exercise 10: LAQ x 20/HS Curls x 20 (green)  Exercise 11: Leg press (ball b/w knees 4 plates 2 R00)/SBOPBM leg L x1 plate x 10  Exercise 12: TKE (green) x 15  Exercise 13: Wall sits (ball b/w knees) 4 x 15\"  Exercise 14:  trampoline x 1 min  Exercise 15: Step ups fwd/lateral/retro 6\" step x 10 ea 4\" step 2022  Exercise 16: Standing hip abd/ext x 10 ea  Exercise 17: Heel raises x 20 ea  Exercise 18: Partial alternating lunges in  bars x 10 ea  Exercise 19:  Monster walk with t-band (yellow band at thigh level) down/back x 2- not today  Exercise 20: Treadmill (focus on normal gait pattern)- level x 5mins at 2.2 mph             ICE to left knee x 5 min-she had to leave a few mins early       IF EDEMA CONTINUES MAY TRY KINESIOTAPE HORSESHOE TECH OR EDEMA CROSS PATTERN TECH-applied edema shelby cross pattern 7/20/2022                  HEP issued 7/15/2022                           Assessment:   Conditions Requiring Skilled Therapeutic Intervention  Body Structures, Functions, Activity Limitations Requiring Skilled Therapeutic Intervention: Decreased functional mobility ; Decreased ROM; Decreased strength;Decreased high-level IADLs  Assessment: IPatient did well with session. She relates she feels the taping may have helped. No redness, burning or itching noted. She was able to complete more exerices today. Only occ min verbal cueing. She denies having any pain pre or post session, just some mild tightness. Treatment Diagnosis: L knee pain        Goals:  Short Term Goals  Time Frame for Short term goals: 3-4 weeks  Short term goal 1: Independent with HEP  STG Goal 1 Status[de-identified] New  Short term goal 2: Improve L knee ROM to 0 degrees extension  STG 2 Current Status[de-identified] 0 degrees extension during tx today  STG Goal 2 Status[de-identified] New, Met  Short term goal 3: Perform 10 good quality L quad sets with good VMO activation  STG Goal 3 Status[de-identified] New  Long Term Goals  Time Frame for Long term goals : 4-6 weeks  Long term goal 1: Demonstrate equal knee extension and heel strike bilaterally during gait.   LTG Goal 1 Status[de-identified] New  Long term goal 2: No pain with ascending/descending stairs  LTG Goal 2 Status[de-identified] New  Long term goal 3: Return to walking for exercise  LTG Goal 3 Status[de-identified] New  Long term goal 4: Improve LEFS score to at least 65/80  LTG Goal 4 Status[de-identified] New  Patient Goals   Patient goals : reduce L knee pain, return to PLOF    Plan:    Plan  Plan weeks: 4-6 weeks  Current Treatment Recommendations: Strengthening, ROM, Manual Therapy - Joint Manipulation, Manual Therapy - Soft Tissue

## 2022-07-26 ENCOUNTER — HOSPITAL ENCOUNTER (OUTPATIENT)
Dept: PHYSICAL THERAPY | Age: 57
Setting detail: THERAPIES SERIES
Discharge: HOME | End: 2022-07-26
Payer: COMMERCIAL

## 2022-07-26 PROCEDURE — 97110 THERAPEUTIC EXERCISES: CPT

## 2022-07-26 ASSESSMENT — PAIN DESCRIPTION - PAIN TYPE: TYPE: ACUTE PAIN

## 2022-07-26 ASSESSMENT — PAIN DESCRIPTION - DESCRIPTORS: DESCRIPTORS: ACHING;SORE

## 2022-07-26 ASSESSMENT — PAIN DESCRIPTION - FREQUENCY: FREQUENCY: INTERMITTENT

## 2022-07-26 ASSESSMENT — PAIN DESCRIPTION - ORIENTATION: ORIENTATION: LEFT

## 2022-07-26 ASSESSMENT — PAIN DESCRIPTION - LOCATION: LOCATION: KNEE

## 2022-07-26 ASSESSMENT — PAIN SCALES - GENERAL: PAINLEVEL_OUTOF10: 1

## 2022-07-26 NOTE — PROGRESS NOTES
Physical Therapy  Daily Treatment Note  Date: 2022  Patient Name: Krystle Copeland  MRN: [id number]     :   1965    Referring Physician: MD Yoly Christopher MD   PCP: NICOLE Ohara    Medical Diagnosis: Effusion, left knee [T64.092]    Treatment Diagnosis: L knee pain      Insurance: Payor: [employer] / Plan: Tagent[employer] Marks / Product Type: *No Product type* /   Insurance ID: [id number] - ([facility])    Subjective:   General  Referring Provider (secondary): Yoly Munoz MD  PT Visit Information  Total # of Visits Approved: 12  Total # of Visits to Date: 5  Plan of Care/Certification Expiration Date: 10/04/22  Progress Note Due Date: 22  Subjective  Subjective: I misplaced the extra tape you gave me. And I can tell I havent had any on since Saturday. Pain Screening  Patient Currently in Pain: Yes  Pain Assessment: 0-10  Pain Level: 1  Pain Type: Acute pain  Pain Location: Knee  Pain Orientation: Left  Pain Descriptors: Aching, Sore  Pain Frequency: Intermittent       Treatment Activities:  Exercises:      Treatment Reasoning    Exercise 1: Airdyne x 6'  -seat 6                       LEFT KNEE  Exercise 2: Quad sets/VMO quad sets on towel 3\" x 20 ea  Exercise 3: Heel slides x 20  Exercise 4: Knee extension stretch on towel x 2'  Exercise 5: SLR (neutral x 15, ER x 15, IR x 15)  Exercise 6: 3 way SLR (abd, ext, add) x 15 ea  Exercise 7: Prone HS Curls (neutral, ER, IR) x 15 ea  Exercise 8: SAQ x 15-increases pain  Exercise 9: Bridging x 15/Single leg L x 15  Exercise 10: LAQ x 20/HS Curls x 20 (green)  Exercise 11: Leg press (ball b/w knees 4 plates 2 U62)/JUIURF leg L x1 plate x 10  Exercise 12: TKE (green) x 15  Exercise 13: Wall sits (ball b/w knees) 4 x 15\"  Exercise 14:  trampoline x 1 min  Exercise 15:  Step ups fwd/lateral/retro 6\" step x 10 ea 4\" step 2022  Exercise 16: Standing hip abd/ext x 10 ea  Exercise 17: Heel raises x 20 ea  Exercise 18: Partial alternating lunges in  bars x 10 ea  Exercise 19: Monster walk with t-band (yellow band at thigh level) down/back x 1  Exercise 20: Treadmill (focus on normal gait pattern)- level x 5mins at 2.2 mph             ICE to left knee x 10 min-      IF EDEMA CONTINUES MAY TRY KINESIOTAPE HORSESHOE TECH OR EDEMA CROSS PATTERN TECH-applied edema shelby cross pattern 7/26/2022                  HEP issued 7/15/2022                           Assessment:   Conditions Requiring Skilled Therapeutic Intervention  Body Structures, Functions, Activity Limitations Requiring Skilled Therapeutic Intervention: Decreased functional mobility ; Decreased ROM; Decreased strength;Decreased high-level IADLs  Assessment: Patient continues to do well with therapy. She relates she misplaced her extra kinesiotape and has not had any on since Saturday. Also relates she definitely felt like it helped her. Kinesiotape reapplied in shelby cross pattern prior to ex. She was able to complete all ex today and added monster wallks with yellow band and issued yellow band for home. Applied ice at end of session  x 10 mins. Treatment Diagnosis: L knee pain        Goals:  Short Term Goals  Time Frame for Short term goals: 3-4 weeks  Short term goal 1: Independent with HEP  STG Goal 1 Status[de-identified] New  Short term goal 2: Improve L knee ROM to 0 degrees extension  STG 2 Current Status[de-identified] 0 degrees extension during tx today  STG Goal 2 Status[de-identified] New, Met  Short term goal 3: Perform 10 good quality L quad sets with good VMO activation  STG Goal 3 Status[de-identified] New  Long Term Goals  Time Frame for Long term goals : 4-6 weeks  Long term goal 1: Demonstrate equal knee extension and heel strike bilaterally during gait.   LTG Goal 1 Status[de-identified] New  Long term goal 2: No pain with ascending/descending stairs  LTG Goal 2 Status[de-identified] New  Long term goal 3: Return to walking for exercise  LTG Goal 3 Status[de-identified] New  Long term goal 4: Improve LEFS score to at least 65/80  LTG Goal 4 Status[de-identified] New  Patient Goals   Patient goals : reduce L knee pain, return to PLOF    Plan:    Plan  Plan weeks: 4-6 weeks  Current Treatment Recommendations: Strengthening, ROM, Manual Therapy - Joint Manipulation, Manual Therapy - Soft Tissue Mobilization, Neuromuscular re-education, Return to work related activity, Home exercise program, Patient/Caregiver education & training, Modalities  Timed Code Treatment Minutes: 47 Minutes (plus 10 mins ice pack)     Therapy Time:   Individual Concurrent Group Co-treatment   Time In 1600         Time Out 1657         Minutes 57         Timed Code Treatment Minutes: 47 Minutes (plus 10 mins ice pack)       Ranjit Mclaughlin PTA     Electronically signed by Ranjit Mclaughlin PTA on 7/26/2022 at 5:11 PM

## 2022-07-28 ENCOUNTER — HOSPITAL ENCOUNTER (OUTPATIENT)
Dept: PHYSICAL THERAPY | Age: 57
Setting detail: THERAPIES SERIES
Discharge: HOME | End: 2022-07-28
Payer: COMMERCIAL

## 2022-07-28 PROCEDURE — 97110 THERAPEUTIC EXERCISES: CPT

## 2022-07-28 NOTE — PROGRESS NOTES
Physical Therapy  Daily Treatment Note  Date: 2022  Patient Name: Raisa Ervin  MRN: [id number]     :   1965    Referring Physician: Phuong Parikh MD     PCP: NICOLE Chaves    Medical Diagnosis: Effusion, left knee [A17.789]    Treatment Diagnosis: L knee pain      Insurance: Payor: Gavin Antonio / Plan: Audrey  [address] Marks / Product Type: *No Product type* /   Insurance ID: [id number] - ([facility])    Subjective:           Treatment Activities:  Exercises:      Treatment Reasoning    Exercise 1: Airdyne x 6'  -seat 6                       LEFT KNEE  Exercise 2: Quad sets/VMO quad sets on towel 3\" x 20 ea  Exercise 3: Heel slides x 20  Exercise 4: Knee extension stretch on towel x 2'  Exercise 5: SLR (neutral x 15, ER x 15, IR x 15) 1#  Exercise 6: 3 way SLR (abd, ext, add) x 15 ea with 1#  Exercise 7: Prone HS Curls (neutral, ER, IR) x 15 ea 1#  Exercise 8: SAQ x 15-increases pain-no weight  Exercise 9: Bridging x 15/Single leg L x 15  Exercise 10: LAQ x 20/HS Curls x 20 (green)  Exercise 11: Leg press (ball b/w knees 4 plates 2 Z59)/MWDQCV leg L x1 plate x 10  Exercise 12: TKE (green) x 15  Exercise 13: Wall sits (ball b/w knees) 4 x 15\"  Exercise 14: March on trampoline x 1 min  Exercise 15: Step ups fwd/lateral/retro 6\" step x 10 ea 4\" step 2022  Exercise 16: Standing hip abd/ext x 15 ea 1# bilateral  Exercise 17: Heel raises x 20 ea 1#  Exercise 18: Partial alternating lunges in  bars x 10 ea  Exercise 19:  Monster walk with t-band (yellow band at thigh level) down/back x 2  Exercise 20: Treadmill (focus on normal gait pattern)- 1% incline x 5mins at 2.6 mph             ICE to left knee x 10 min-      IF EDEMA CONTINUES MAY TRY KINESIOTAPE HORSESHOE TECH OR EDEMA CROSS PATTERN TECH-applied edema shelby cross pattern 2022                  HEP issued 7/15/2022                           Assessment:   Conditions Requiring Skilled Therapeutic Intervention  Body Structures, Functions, Activity Limitations Requiring Skilled Therapeutic Intervention: Decreased functional mobility ; Decreased ROM; Decreased strength;Decreased high-level IADLs  Assessment: Patient presents to therapy with reports of feeling better overall. She still has kinesiotape in place from previous session. Added low weight for several of the exercises. Also added slight incline for treadmill. Patient denies pain pre or post session. Instead, she says her knee is slightly aggravated at times. Treatment Diagnosis: L knee pain        Goals:  Short Term Goals  Time Frame for Short term goals: 3-4 weeks  Short term goal 1: Independent with HEP  STG Goal 1 Status[de-identified] New  Short term goal 2: Improve L knee ROM to 0 degrees extension  STG 2 Current Status[de-identified] 0 degrees extension during tx today  STG Goal 2 Status[de-identified] New, Met  Short term goal 3: Perform 10 good quality L quad sets with good VMO activation  STG Goal 3 Status[de-identified] New  Long Term Goals  Time Frame for Long term goals : 4-6 weeks  Long term goal 1: Demonstrate equal knee extension and heel strike bilaterally during gait.   LTG Goal 1 Status[de-identified] New  Long term goal 2: No pain with ascending/descending stairs  LTG Goal 2 Status[de-identified] New  Long term goal 3: Return to walking for exercise  LTG Goal 3 Status[de-identified] New  Long term goal 4: Improve LEFS score to at least 65/80  LTG Goal 4 Status[de-identified] New  Patient Goals   Patient goals : reduce L knee pain, return to PLOF    Plan:    Plan  Plan weeks: 4-6 weeks  Current Treatment Recommendations: Strengthening, ROM, Manual Therapy - Joint Manipulation, Manual Therapy - Soft Tissue Mobilization, Neuromuscular re-education, Return to work related activity, Home exercise program, Patient/Caregiver education & training, Modalities  Timed Code Treatment Minutes: 45 Minutes (plus 10 mins ice pack)     Therapy Time:   Individual Concurrent Group Co-treatment   Time In 1600         Time Out 1655         Minutes 55         Timed Code Treatment Minutes: 45 Minutes (plus 10 mins ice pack)       Brenda Echeverria PTA     Electronically signed by Brenda Echeverria PTA on 7/28/2022 at 5:19 PM

## 2022-08-02 ENCOUNTER — HOSPITAL ENCOUNTER (OUTPATIENT)
Dept: PHYSICAL THERAPY | Age: 57
Setting detail: THERAPIES SERIES
Discharge: HOME | End: 2022-08-02
Payer: COMMERCIAL

## 2022-08-02 PROCEDURE — 97110 THERAPEUTIC EXERCISES: CPT

## 2022-08-02 NOTE — PROGRESS NOTES
Physical Therapy  Daily Treatment Note  Date: 2022  Patient Name: King Barnes  MRN: [id number]     :   1965      Subjective:   General  Additional Pertinent Hx: 56 y/o F presents with complaint of L knee pain. PMH includes osteoporosis  PT Visit Information  Total # of Visits Approved: 12  Total # of Visits to Date: 6  Plan of Care/Certification Expiration Date: 10/04/22  Progress Note Due Date: 22  Subjective: I can tell the tape is helping. I just wish that last little bit of swelling would get out and I think it would be better. Pain Screening  Patient Currently in Pain: Denies         Treatment Activities:    Exercises  Exercise 1: Airdyne x 5 minutes  -seat 6                       LEFT KNEE  Exercise 2: Quad sets/VMO quad sets on towel 3 second hold x 20 ea  Exercise 3: Heel slides x 20  Exercise 4: Knee extension stretch on towel x 2 minutes  Exercise 5: SLR (neutral x 15, ER x 15, IR x 15)   with 1 pound weight  Exercise 6: 3 way SLR (abd, ext, add) x 15 ea with 1 pound weight  Exercise 7: Prone HS Curls (neutral, ER, IR) x 15 ea  with 1 pound weight  Exercise 8: SAQ x 15- no increased pain today  x 10 with 1 pound weight  Exercise 9: Bridging x 15/Single leg L x 15  Exercise 10: LAQ x 20/HS Curls x 20 (green)  Exercise 11: Leg press (ball b/w knees 4 plates 2 R87)/PPOLNZ leg L x1 plate x 10  Exercise 12: TKE (green) x 15  Exercise 13: Wall sits (ball b/w knees) x 2 with 30 second hold  Exercise 14:  trampoline x 1 min  Exercise 15: Step ups fwd/lateral/retro 6 inch step x 10 ea       8 inch step x 10 forward  Exercise 16: Standing hip abd/ext x 20 ea 1 pound weight bilaterally  Exercise 17: Heel raises x 20 ea with 1 pound weight  Exercise 18: Partial alternating lunges in box x 10 ea with 1 pound weight  Exercise 19:  Monster walk with t-band (green band at thigh level) down/back x 2  with 1 pound weights  Exercise 20: Treadmill (focus on normal gait pattern)- 1% incline x 5mins at 2.6 mph             ICE to left knee x 10 min-      IF EDEMA CONTINUES MAY TRY KINESIOTAPE HORSESHOE TECH OR EDEMA CROSS PATTERN TECH-applied edema shelby cross pattern 08/02/2022      Assessment:   Conditions Requiring Skilled Therapeutic Intervention  Body Structures, Functions, Activity Limitations Requiring Skilled Therapeutic Intervention: Decreased functional mobility ; Decreased ROM; Decreased strength;Decreased high-level IADLs  Assessment: Patient tolerates therapy well with report of tightness in left knee. She request kinesiotape again today to decrease small edema left in knee. Weight added for several standing exercises. Patient denies pain pre or post session. Treatment Diagnosis: L knee pain  Therapy Prognosis: Excellent        Goals:Short Term Goals  Time Frame for Short term goals: 3-4 weeks  Short term goal 1: Independent with HEP  Short term goal 2: Improve L knee ROM to 0 degrees extension  STG 2 Current Status[de-identified] 0 degrees extension during tx today  STG Goal 2 Status[de-identified] Met  Short term goal 3: Perform 10 good quality L quad sets with good VMO activation  Long Term Goals  Time Frame for Long term goals : 4-6 weeks  Long term goal 1: Demonstrate equal knee extension and heel strike bilaterally during gait.   Long term goal 2: No pain with ascending/descending stairs  Long term goal 3: Return to walking for exercise  Long term goal 4: Improve LEFS score to at least 65/80  Patient Goals   Patient goals : reduce L knee pain, return to PLOF      Plan:    Plan  Plan weeks: 4-6 weeks  Current Treatment Recommendations: Strengthening, ROM, Manual Therapy - Joint Manipulation, Manual Therapy - Soft Tissue Mobilization, Neuromuscular re-education, Return to work related activity, Home exercise program, Patient/Caregiver education & training, Modalities  Timed Code Treatment Minutes: 50 Minutes       Therapy Time   Individual Concurrent Group Co-treatment   Time In 1600         Time Out 1700 Minutes 60         Timed Code Treatment Minutes: 50 Minutes       Electronically signed by Lj Cuba PTA on 8/2/2022 at 5:04 PM

## 2022-08-04 ENCOUNTER — HOSPITAL ENCOUNTER (OUTPATIENT)
Dept: PHYSICAL THERAPY | Age: 57
Setting detail: THERAPIES SERIES
Discharge: HOME | End: 2022-08-04
Payer: COMMERCIAL

## 2022-08-04 PROCEDURE — 97110 THERAPEUTIC EXERCISES: CPT

## 2022-08-04 ASSESSMENT — PAIN SCALES - GENERAL: PAINLEVEL_OUTOF10: 0

## 2022-08-04 NOTE — PROGRESS NOTES
Physical Therapy  Daily Treatment Note/Reassessment/Discharge Note  Date: 2022  Patient Name: Levar Lopez  MRN: [id number]     :   1965    Referring Physician: MD Ab Sanchez MD   PCP: NICOLE Landry    Medical Diagnosis: Effusion, left knee [M25.462] L knee pain  Treatment Diagnosis: L knee pain      Insurance: Payor: [employer] / Plan: [employer] [address] / Product Type: *No Product type* /   Insurance ID: [id number] - ([facility])    Subjective:   General  Additional Pertinent Hx: 54 y/o F presents with complaint of L knee pain. PMH includes osteoporosis  Diagnosis: L knee pain  Referring Provider (secondary): Ab Key MD  PT Visit Information  Total # of Visits Approved: 12  Total # of Visits to Date: 7  Plan of Care/Certification Expiration Date: 10/04/22  Progress Note Due Date: 22  Subjective  Subjective: Patient states she feels she is improving since starting PT, still has some problems with swelling and stiffness. She is back to walking in neighborhood, not quite yet at the pace she is accostomed to. She is comfortable with her home exercises and wishes to make today her last session.   Pain Screening  Pain Assessment: 0-10  Pain Level: 0       Treatment Activities:  Exercises:      Treatment Reasoning    Exercise 1: Airdyne x 5 minutes  -seat 6                       LEFT KNEE  Exercise 2: Quad sets/VMO quad sets on towel 3 second hold x 20 ea  Exercise 3: Heel slides x 20  Exercise 4: Knee extension stretch on towel x 2 minutes  Exercise 5: SLR (neutral x 15, ER x 15, IR x 15)   with 1 pound weight  Exercise 6: 3 way SLR (abd, ext, add) x 15 ea with 1 pound weight  Exercise 7: Prone HS Curls (neutral, ER, IR) x 15 ea  with 1 pound weight  Exercise 8: SAQ x 15- no increased pain today  x  with 1 pound weight, with slower eccentric lowering  Exercise 9: Bridging x 15/Single leg L x 15  Exercise 10: LAQ x 20/HS Curls x 20 (green)  Exercise 11: Leg press (ball b/w knees 4 plates 2 P38)/QOFQDW leg L x1 plate x 10  Exercise 12: TKE (green) x 15  Exercise 13: Wall sits (ball b/w knees) x 2 with 30 second hold  Exercise 14: March on trampoline x 1 min  Exercise 15: Step ups fwd/lateral/retro 6 inch step x 10 ea       8 inch step x 10 forward  Exercise 16: Standing hip abd/ext x 20 ea 1 pound weight bilaterally  Exercise 17: Heel raises x 20 ea with 1 pound weight  Exercise 18: Partial alternating lunges in box x 10 ea with 1 pound weight  Exercise 19: Monster walk with t-band (green band at thigh level) down/back x 2  with 1 pound weights  Exercise 20: Treadmill (focus on normal gait pattern)- 1% incline x 5mins at 2.6 mph             ICE to left knee x 10 min-      IF EDEMA CONTINUES MAY TRY KINESIOTAPE HORSESHOE TECH OR EDEMA CROSS PATTERN TECH-applied edema shelby cross pattern 08/02/2022    Therapist provided: HEP issued 07/15                      Assessment:   Conditions Requiring Skilled Therapeutic Intervention  Body Structures, Functions, Activity Limitations Requiring Skilled Therapeutic Intervention: Decreased functional mobility ; Decreased ROM; Decreased strength;Decreased high-level IADLs  Assessment: Ms. Rachael Lowe appears to have made good progress since the initiation of PT as determined by today's reassessment. While she is still having complaints of knee swelling, she is reporting she is back to walking outside, has 0 degrees left knee extension, gait pattern appears normalized, and she is tolerating her home exercises well. She is relating more discomfort ascending hills and with descending stairs, partially due to potential eccentric muscle weakness. Overall, she appears satisfied with her progress and wishes to make today her last PT session. She will contact us if she is having more difficulty in the future. Discharge from PT today.   Treatment Diagnosis: L knee pain  Therapy Prognosis: Excellent  Requires PT Follow-Up: No      Goals:  Short Term Goals  Time Frame for Short term goals: 3-4 weeks  Short term goal 1: Independent with HEP  STG 1 Current Status[de-identified] 8/4/22 Patient has HEP and is regularly performing them. STG Goal 1 Status[de-identified] Met  Short term goal 2: Improve L knee ROM to 0 degrees extension  STG 2 Current Status[de-identified] 8/4/22 Zero  degrees extension during tx today  STG Goal 2 Status[de-identified] Met  Short term goal 3: Perform 10 good quality L quad sets with good VMO activation  STG 3 Current Status[de-identified] 8/4/22 Patient is able to perform 10 quality left knee quad sets with moderate to strong activitation. STG Goal 3 Status[de-identified] Met  Long Term Goals  Time Frame for Long term goals : 4-6 weeks  Long term goal 1: Demonstrate equal knee extension and heel strike bilaterally during gait. LTG 1 Current Status[de-identified] Patient's gait pattern appears roughly equal between sides during heel strike. LTG Goal 1 Status[de-identified] Met  Long term goal 2: No pain with ascending/descending stairs  LTG 2 Current Status[de-identified] 8/4/22 Patient reports that both ascending and descending stairs has improved but she is still having some difficulty. with descending. LTG Goal 2 Status[de-identified] Partially met  Long term goal 3: Return to walking for exercise. LTG 3 Current Status[de-identified] 8/4/22 She is having trouble with inclines but has returned back to her normal walk with  (2 miles). LTG Goal 3 Status[de-identified] Met  Long term goal 4: Improve LEFS score to at least 65/80. LTG 4 Current Status[de-identified] 8/4/22 Patient scored 60/80 on the Lower Extremity Functional Scale. LTG Goal 4 Status[de-identified] Partially met  Patient Goals   Patient goals : reduce L knee pain, return to PLOF    Plan:    Plan  Plan weeks: 4-6 weeks  Current Treatment Recommendations: Strengthening, ROM, Manual Therapy - Joint Manipulation, Manual Therapy - Soft Tissue Mobilization, Neuromuscular re-education, Return to work related activity, Home exercise program, Patient/Caregiver education & training, Modalities  Plan Comment: Discharge from PT today.   Timed Code Treatment Minutes: 55 Minutes     Therapy Time:   Individual Concurrent Group Co-treatment   Time In 2149         Time Out 1659         Minutes 55         Timed Code Treatment Minutes: [address], PT

## 2023-01-07 RX ORDER — DENOSUMAB 60 MG/ML
INJECTION SUBCUTANEOUS
Qty: 1 ML | Refills: 0 | Status: SHIPPED | OUTPATIENT
Start: 2023-01-07

## 2023-01-07 NOTE — TELEPHONE ENCOUNTER
Lucia Mart called to request a refill on her medication.       Last office visit : 5/25/2022   Next office visit : 1/13/2023     Requested Prescriptions     Pending Prescriptions Disp Refills    PROLIA 60 MG/ML SOSY SC injection [Pharmacy Med Name: Ebony Robison 60MG/ML PFS (SRX)] 1 mL 0     Sig: INJECT 60MG (1 SYRINGE) UNDER THE SKIN EVERY 6 MONTHS            Ilana Sorensen LPN

## 2023-01-13 ENCOUNTER — OFFICE VISIT (OUTPATIENT)
Dept: PRIMARY CARE CLINIC | Age: 58
End: 2023-01-13
Payer: COMMERCIAL

## 2023-01-13 VITALS
SYSTOLIC BLOOD PRESSURE: 122 MMHG | HEART RATE: 81 BPM | TEMPERATURE: 97.2 F | WEIGHT: 133 LBS | DIASTOLIC BLOOD PRESSURE: 74 MMHG | HEIGHT: 67 IN | OXYGEN SATURATION: 96 % | BODY MASS INDEX: 20.88 KG/M2

## 2023-01-13 DIAGNOSIS — E55.9 VITAMIN D DEFICIENCY: ICD-10-CM

## 2023-01-13 DIAGNOSIS — M81.0 OSTEOPOROSIS OF MULTIPLE SITES: ICD-10-CM

## 2023-01-13 DIAGNOSIS — Z13.0 SCREENING FOR DEFICIENCY ANEMIA: ICD-10-CM

## 2023-01-13 DIAGNOSIS — Z00.01 ENCOUNTER FOR WELL ADULT EXAM WITH ABNORMAL FINDINGS: Primary | ICD-10-CM

## 2023-01-13 DIAGNOSIS — Z53.20 SCREENING FOR HEPATITIS C DECLINED: ICD-10-CM

## 2023-01-13 PROBLEM — K35.80 ACUTE APPENDICITIS: Status: RESOLVED | Noted: 2022-04-17 | Resolved: 2023-01-13

## 2023-01-13 LAB
ALBUMIN SERPL-MCNC: 4.7 G/DL (ref 3.5–5.2)
ALP BLD-CCNC: 53 U/L (ref 35–104)
ALT SERPL-CCNC: 20 U/L (ref 5–33)
ANION GAP SERPL CALCULATED.3IONS-SCNC: 11 MMOL/L (ref 7–19)
AST SERPL-CCNC: 18 U/L (ref 5–32)
BASOPHILS ABSOLUTE: 0 K/UL (ref 0–0.2)
BASOPHILS RELATIVE PERCENT: 0.6 % (ref 0–1)
BILIRUB SERPL-MCNC: 0.3 MG/DL (ref 0.2–1.2)
BUN BLDV-MCNC: 15 MG/DL (ref 6–20)
CALCIUM SERPL-MCNC: 9.9 MG/DL (ref 8.6–10)
CHLORIDE BLD-SCNC: 102 MMOL/L (ref 98–111)
CO2: 28 MMOL/L (ref 22–29)
CREAT SERPL-MCNC: 0.7 MG/DL (ref 0.5–0.9)
EOSINOPHILS ABSOLUTE: 0.2 K/UL (ref 0–0.6)
EOSINOPHILS RELATIVE PERCENT: 2.6 % (ref 0–5)
GFR SERPL CREATININE-BSD FRML MDRD: >60 ML/MIN/{1.73_M2}
GLUCOSE BLD-MCNC: 93 MG/DL (ref 74–109)
HCT VFR BLD CALC: 47.9 % (ref 37–47)
HEMOGLOBIN: 15.7 G/DL (ref 12–16)
IMMATURE GRANULOCYTES #: 0 K/UL
LYMPHOCYTES ABSOLUTE: 2.1 K/UL (ref 1.1–4.5)
LYMPHOCYTES RELATIVE PERCENT: 28.9 % (ref 20–40)
MCH RBC QN AUTO: 30.6 PG (ref 27–31)
MCHC RBC AUTO-ENTMCNC: 32.8 G/DL (ref 33–37)
MCV RBC AUTO: 93.4 FL (ref 81–99)
MONOCYTES ABSOLUTE: 0.5 K/UL (ref 0–0.9)
MONOCYTES RELATIVE PERCENT: 7.2 % (ref 0–10)
NEUTROPHILS ABSOLUTE: 4.4 K/UL (ref 1.5–7.5)
NEUTROPHILS RELATIVE PERCENT: 60.4 % (ref 50–65)
PDW BLD-RTO: 11.9 % (ref 11.5–14.5)
PHOSPHORUS: 3.8 MG/DL (ref 2.5–4.5)
PLATELET # BLD: 352 K/UL (ref 130–400)
PMV BLD AUTO: 9.5 FL (ref 9.4–12.3)
POTASSIUM SERPL-SCNC: 3.9 MMOL/L (ref 3.5–5)
RBC # BLD: 5.13 M/UL (ref 4.2–5.4)
SODIUM BLD-SCNC: 141 MMOL/L (ref 136–145)
TOTAL PROTEIN: 7.7 G/DL (ref 6.6–8.7)
VITAMIN D 25-HYDROXY: 60.8 NG/ML
WBC # BLD: 7.2 K/UL (ref 4.8–10.8)

## 2023-01-13 PROCEDURE — 99396 PREV VISIT EST AGE 40-64: CPT | Performed by: INTERNAL MEDICINE

## 2023-01-13 ASSESSMENT — VISUAL ACUITY: OU: 1

## 2023-01-13 ASSESSMENT — ENCOUNTER SYMPTOMS
VOMITING: 0
BLOOD IN STOOL: 0
ABDOMINAL PAIN: 0
VOICE CHANGE: 0
EYE DISCHARGE: 0
EYE REDNESS: 0
EYE PAIN: 0
SHORTNESS OF BREATH: 0
SINUS PRESSURE: 0
DIARRHEA: 0
SORE THROAT: 0
COLOR CHANGE: 0
COUGH: 0
WHEEZING: 0
RHINORRHEA: 0
CHEST TIGHTNESS: 0

## 2023-01-13 ASSESSMENT — PATIENT HEALTH QUESTIONNAIRE - PHQ9
1. LITTLE INTEREST OR PLEASURE IN DOING THINGS: 0
SUM OF ALL RESPONSES TO PHQ QUESTIONS 1-9: 0
SUM OF ALL RESPONSES TO PHQ9 QUESTIONS 1 & 2: 0
2. FEELING DOWN, DEPRESSED OR HOPELESS: 0

## 2023-01-13 NOTE — PATIENT INSTRUCTIONS
Well Visit, Ages 25 to 72: Care Instructions  Well visits can help you stay healthy. Your doctor has checked your overall health and may have suggested ways to take good care of yourself. Your doctor also may have recommended tests. You can help prevent illness with healthy eating, good sleep, vaccinations, regular exercise, and other steps. Get the tests that you and your doctor decide on. Depending on your age and risks, examples might include screening for diabetes; hepatitis C; HIV; and cervical, breast, lung, and colon cancer. Screening helps find diseases before any symptoms appear. Eat healthy foods. Choose fruits, vegetables, whole grains, lean protein, and low-fat dairy foods. Limit saturated fat and reduce salt. Limit alcohol. Men should have no more than 2 drinks a day. Women should have no more than 1. For some people, no alcohol is the best choice. Exercise. Get at least 30 minutes of exercise on most days of the week. Walking can be a good choice. Reach and stay at your healthy weight. This will lower your risk for many health problems. Take care of your mental health. Try to stay connected with friends, family, and community, and find ways to manage stress. If you're feeling depressed or hopeless, talk to someone. A counselor can help. If you don't have a counselor, talk to your doctor. Talk to your doctor if you think you may have a problem with alcohol or drug use. This includes prescription medicines and illegal drugs. Avoid tobacco and nicotine: Don't smoke, vape, or chew. If you need help quitting, talk to your doctor. Practice safer sex. Getting tested, using condoms or dental dams, and limiting sex partners can help prevent STIs. Use birth control if it's important to you to prevent pregnancy. Talk with your doctor about your choices and what might be best for you. Prevent problems where you can.  Protect your skin from too much sun, wash your hands, brush your teeth twice a day, and wear a seat belt in the car. Where can you learn more? Go to http://www.roberts.com/ and enter P072 to learn more about \"Well Visit, Ages 25 to 72: Care Instructions. \"  Current as of: March 9, 2022               Content Version: 13.5  © 2782-2757 [employer]. Care instructions adapted under license by [facility] ([facility]). If you have questions about a medical condition or this instruction, always ask your healthcare professional. [address] any warranty or liability for your use of this information. A Healthy Lifestyle: Care Instructions  A healthy lifestyle can help you feel good, have more energy, and stay at a weight that's healthy for you. You can share a healthy lifestyle with your friends and family. And you can do it on your own. Eat meals with your friends or family. You could try cooking together. Plan activities with other people. Go for a walk with a friend, try a free online fitness class, or join a sports league. Eat a variety of healthy foods. These include fruits, vegetables, whole grains, low-fat dairy, and lean protein. Choose healthy portions of food. You can use the Nutrition Facts label on food packages as a guide. Eat more fruits and vegetables. You could add vegetables to sandwiches or add fruit to cereal.   Drink water when you are thirsty. Limit soda, juice, and sports drinks. Try to exercise most days. Aim for at least 2½ hours of exercise each week. Keep moving. Work in the garden or take your dog on a walk. Use the stairs instead of the elevator. If you use tobacco or nicotine, try to quit. Ask your doctor about programs and medicines to help you quit. Limit alcohol. Men should have no more than 2 drinks a day. Women should have no more than 1. For some people, no alcohol is the best choice. Follow-up care is a key part of your treatment and safety.  Be sure to make and go to all appointments, and call your doctor if you are having problems. It's also a good idea to know your test results and keep a list of the medicines you take. Where can you learn more? Go to http://www.roberts.com/ and enter U807 to learn more about \"A Healthy Lifestyle: Care Instructions. \"  Current as of: March 9, 2022               Content Version: 13.5  © 2006-2022 [employer], [employer]. Care instructions adapted under license by [facility] ([facility]). If you have questions about a medical condition or this instruction, always ask your healthcare professional. [address] any warranty or liability for your use of this information.

## 2023-01-13 NOTE — PROGRESS NOTES
Juan Mackenzie is a 65 y.o. female who presents today for   Chief Complaint   Patient presents with    New Patient    Annual Exam       HPI  66 y/o WF here to establish new PCP, annual wellness, and follow up on osteoporosis that is treated with prolia. Patient had first prolia injection for treatment of osteoporosis in July 2022 and she is due for next injection now. Medicine has been ordered from  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 She is followed by gynecology yearly for Pap smears and breast exams which are up-to-date. Flu vaccine and COVID vaccines plus COVID booster are all up-to-date. Osteoporosis/osteopenia:  Current pharmacologic therapy and date started Prolia (denosumab)- 60 mg .  Medication side effects: none. Last DXA:  6/21/2021-  T score at spine -2.9 and lowest hip -2.0, which was worse compared to previous scan. Current calcium intake is at least 1200 mg/day from diet and supplements: yes. She is currently taking 2000 IU/day of supplemental vitamin D. Regular weight-bearing exercise: yes. Review of Systems   Constitutional:  Negative for appetite change, chills, fatigue and fever. HENT:  Negative for ear pain, rhinorrhea, sinus pressure, sore throat and voice change. Eyes:  Negative for pain, discharge and redness. Respiratory:  Negative for cough, chest tightness, shortness of breath and wheezing. Cardiovascular:  Negative for chest pain and palpitations. Gastrointestinal:  Negative for abdominal pain, blood in stool, diarrhea and vomiting. Endocrine: Negative for cold intolerance, heat intolerance and polydipsia. Genitourinary:  Negative for dysuria and hematuria. Musculoskeletal:  Negative for arthralgias, myalgias, neck pain and neck stiffness. Skin:  Negative for color change and rash.    Neurological:  Negative for dizziness, tremors, syncope, speech difficulty, weakness, numbness and headaches. Hematological:  Negative for adenopathy. Does not bruise/bleed easily. Psychiatric/Behavioral:  Negative for confusion, dysphoric mood and sleep disturbance. The patient is not nervous/anxious. All other systems reviewed and are negative. Past Medical History:   Diagnosis Date    C. difficile colitis 2022    Cerumen impaction     Recurrent    Inflammatory arthritis 2022    Left knee, s/p infection    Nosebleed     Osteoporosis     Thyroid nodule        Current Outpatient Medications   Medication Sig Dispense Refill    PROLIA 60 MG/ML SOSY SC injection INJECT 60MG (1 SYRINGE) UNDER THE SKIN EVERY 6 MONTHS 1 mL 0    conjugated estrogens (PREMARIN) 0.625 MG/GM vaginal cream Place 0.5 g vaginally Twice a Week Use nightly at bedtime for 2 weeks, then twice weekly at bedtime 30 g 3    Cholecalciferol (VITAMIN D) 2000 units CAPS capsule Take by mouth      Calcium Carb-Cholecalciferol (CALCIUM-VITAMIN D) 500-400 MG-UNIT TABS 2 tabs daily 180 tablet 5     No current facility-administered medications for this visit.        Allergies   Allergen Reactions    Alendronate Other (See Comments)     Bone pain    Percocet [Oxycodone-Acetaminophen]        Past Surgical History:   Procedure Laterality Date    BREAST CYST ASPIRATION Right 2007    BREAST CYST ASPIRATION  2016    BREAST CYST ASPIRATION  2015    COLONOSCOPY N/A 2/10/2020    Dr Jesusita Kayser, 5 yr recall    [facility] 9 N/A 4/17/2022    APPENDECTOMY LAPAROSCOPIC performed by Ashley Chapin DO at [facility] COLONOSCOPY FLX DX W/COLLJ SPEC WHEN PFRMD N/A 12/8/2016    Dr Maicol Real bleeding small to moderated sized internal hemorrhoids, tubulovillous AP, (- ) dysplasia--3 yr recall    [address] Left 2016       Social History     Tobacco Use    Smoking status: Never    Smokeless tobacco: Never   Vaping Use    Vaping Use: Never used   Substance Use Topics    Alcohol use: Yes     Comment: occasionally    Drug use: No       Family History   Problem Relation Age of Onset    High Blood Pressure Mother     High Cholesterol Mother     Immunodeficiency Mother         low IGG    Osteoporosis Mother         Early 46s    High Blood Pressure Father     High Cholesterol Father     Prostate Cancer Father     Breast Cancer Maternal Grandmother [de-identified]    High Cholesterol Paternal Grandmother     Heart Disease Paternal Grandfather        /74   Pulse 81   Temp 97.2 °F (36.2 °C)   Ht 5' 7\" (1.702 m)   Wt 133 lb (60.3 kg)   LMP 11/01/2017 (Exact Date)   SpO2 96%   BMI 20.83 kg/m²     Physical Exam  Vitals and nursing note reviewed. Constitutional:       General: She is not in acute distress. Appearance: Normal appearance. She is well-developed, well-groomed and normal weight. She is not ill-appearing, toxic-appearing or diaphoretic. HENT:      Head: Normocephalic and atraumatic. Right Ear: Tympanic membrane, ear canal and external ear normal.      Left Ear: Tympanic membrane, ear canal and external ear normal.      Nose: Nose normal.      Mouth/Throat:      Lips: Pink. Mouth: Mucous membranes are moist. No oral lesions. Tongue: No lesions. Palate: No mass and lesions. Pharynx: Oropharynx is clear. Uvula midline. No pharyngeal swelling, oropharyngeal exudate, posterior oropharyngeal erythema or uvula swelling. Tonsils: 1+ on the right. 1+ on the left. Eyes:      General: Lids are normal. Vision grossly intact. No scleral icterus. Extraocular Movements: Extraocular movements intact. Conjunctiva/sclera: Conjunctivae normal.      Pupils: Pupils are equal, round, and reactive to light. Neck:      Thyroid: No thyroid mass or thyromegaly. Vascular: No carotid bruit or JVD.       Trachea: Trachea and phonation normal.   Cardiovascular:      Rate and Rhythm: Normal rate and regular rhythm. Pulses: Normal pulses. Radial pulses are 2+ on the right side and 2+ on the left side. Posterior tibial pulses are 2+ on the right side and 2+ on the left side. Heart sounds: Normal heart sounds. No murmur heard. No friction rub. No gallop. Pulmonary:      Effort: Pulmonary effort is normal. No accessory muscle usage. Breath sounds: Normal breath sounds. No decreased breath sounds, wheezing, rhonchi or rales. Abdominal:      General: Abdomen is flat. Bowel sounds are normal. There is no distension. Palpations: Abdomen is soft. There is no hepatomegaly, splenomegaly or mass. Tenderness: There is no abdominal tenderness. There is no guarding or rebound. Hernia: No hernia is present. Musculoskeletal:         General: Normal range of motion. Right wrist: Normal.      Left wrist: Normal.      Cervical back: Normal range of motion and neck supple. Right lower leg: No edema. Left lower leg: No edema. Right ankle: Normal.      Left ankle: Normal.   Lymphadenopathy:      Head:      Right side of head: No submandibular adenopathy. Left side of head: No submandibular adenopathy. Cervical: No cervical adenopathy. Right cervical: No superficial, deep or posterior cervical adenopathy. Left cervical: No superficial, deep or posterior cervical adenopathy. Upper Body:      Right upper body: No supraclavicular adenopathy. Left upper body: No supraclavicular adenopathy. Lower Body: No right inguinal adenopathy. No left inguinal adenopathy. Skin:     General: Skin is warm and dry. Capillary Refill: Capillary refill takes less than 2 seconds. Coloration: Skin is not cyanotic. Findings: No rash. Nails: There is no clubbing. Neurological:      Mental Status: She is alert and oriented to person, place, and time. Cranial Nerves: No cranial nerve deficit, dysarthria or facial asymmetry. Sensory: Sensation is intact. Motor: Motor function is intact. No weakness, tremor, atrophy or abnormal muscle tone. Coordination: Coordination is intact. Romberg sign negative. Coordination normal.      Gait: Gait is intact. Deep Tendon Reflexes: Reflexes are normal and symmetric. Reflex Scores:       Brachioradialis reflexes are 2+ on the right side and 2+ on the left side. Patellar reflexes are 2+ on the right side and 2+ on the left side. Comments: CN II-XII grossly intact, speech clear, MAEW, no focal deficits   Psychiatric:         Attention and Perception: Attention and perception normal.         Mood and Affect: Mood and affect normal.         Speech: Speech normal.         Behavior: Behavior normal. Behavior is cooperative. Thought Content:  Thought content normal.         Cognition and Memory: Cognition and memory normal.         Judgment: Judgment normal.       Lab Results   Component Value Date    WBC 9.0 04/21/2022    HGB 14.0 04/21/2022    HCT 42.8 04/21/2022    MCV 91.8 04/21/2022     04/21/2022    LYMPHOPCT 13.0 (L) 04/21/2022    RBC 4.66 04/21/2022    MCH 30.0 04/21/2022    MCHC 32.7 (L) 04/21/2022    RDW 12.0 04/21/2022     Lab Results   Component Value Date     04/21/2022    K 3.7 04/21/2022     04/21/2022    CO2 28 04/21/2022    BUN 8 04/21/2022    CREATININE 0.5 04/21/2022    GLUCOSE 116 (H) 04/21/2022    CALCIUM 9.7 04/21/2022    PROT 6.8 04/21/2022    LABALBU 3.9 04/21/2022    BILITOT 0.3 04/21/2022    ALKPHOS 191 (H) 04/21/2022    AST 70 (H) 04/21/2022    ALT 72 (H) 04/21/2022    LABGLOM >60 04/21/2022    GFRAA >59 04/21/2022    GLOB 2.7 11/18/2016          Lab Results   Component Value Date    CHOL 200 (H) 04/12/2022    CHOL 198 06/24/2021    CHOL 199 03/05/2020     Lab Results   Component Value Date    TRIG 79 04/12/2022    TRIG 68 06/24/2021    TRIG 85 03/05/2020     Lab Results   Component Value Date    HDL 80 04/12/2022    HDL 79 06/24/2021    HDL 86 03/05/2020     Lab Results   Component Value Date    LDLCALC 104 04/12/2022    LDLCALC 105 06/24/2021    LDLCALC 96 03/05/2020     No results found for: LABVLDL, VLDL  No results found for: CHOLHDLRATIO    No results found for this visit on 01/13/23. Assessment:    ICD-10-CM    1. Encounter for well adult exam with abnormal findings  Z00.01       2. Osteoporosis of multiple sites  M81.0 Comprehensive Metabolic Panel     Phosphorus     DEXA BONE DENSITY AXIAL SKELETON      3. Vitamin D deficiency  E55.9 Vitamin D 25 Hydroxy      4. Screening for deficiency anemia  Z13.0 CBC with Auto Differential      5. Screening for hepatitis C declined  Z53.20           Plan:  Anuj Olguin was seen today for new patient and annual exam.    Diagnoses and all orders for this visit:    Encounter for well adult exam with abnormal findings    Osteoporosis of multiple sites  -     Comprehensive Metabolic Panel; Future  -     Phosphorus; Future  -     DEXA BONE DENSITY AXIAL SKELETON; Future    Vitamin D deficiency  -     Vitamin D 25 Hydroxy; Future    Screening for deficiency anemia  -     CBC with Auto Differential; Future    Screening for hepatitis C declined      No new labs today. Previous labs reviewed. Requested patient get lab work drawn as ordered. No refills needed today.  -Osteoporosis of Lumbar Spine-prolia refilled recently but has not arrived yet. Will check renal function, calcium, and vitamin D level today to get levels prior to her next prolia dose. -Health Maintenance reviewed - breast exam performed by OB/GYN or General Surgery (advised of need to have breast exam in addition to mammogram), Dexa up to date , PAP smear up to date, mammogram, and colonoscopy up to date for cancer screening  -Return in 1 year (on 1/13/2024). Over 50% of the total visit time of 30 minutes was spent on counseling and/or coordination of care of:   1. Encounter for well adult exam with abnormal findings    2. Osteoporosis of multiple sites    3. Vitamin D deficiency    4. Screening for deficiency anemia    5. Screening for hepatitis C declined         Orders Placed This Encounter   Procedures    DEXA BONE DENSITY AXIAL SKELETON     Standing Status:   Future     Standing Expiration Date:   1/13/2024     Order Specific Question:   Reason for exam:     Answer:   follow up on osteoporosis with prolia treatment    CBC with Auto Differential     Standing Status:   Future     Number of Occurrences:   1     Standing Expiration Date:   1/13/2024    Comprehensive Metabolic Panel     Standing Status:   Future     Number of Occurrences:   1     Standing Expiration Date:   1/13/2024    Phosphorus     Standing Status:   Future     Number of Occurrences:   1     Standing Expiration Date:   1/13/2024    Vitamin D 25 Hydroxy     Standing Status:   Future     Number of Occurrences:   1     Standing Expiration Date:   1/13/2024     No orders of the defined types were placed in this encounter. There are no discontinued medications. Patient Instructions        Well Visit, Ages 25 to 72: Care Instructions  Well visits can help you stay healthy. Your doctor has checked your overall health and may have suggested ways to take good care of yourself. Your doctor also may have recommended tests. You can help prevent illness with healthy eating, good sleep, vaccinations, regular exercise, and other steps. Get the tests that you and your doctor decide on. Depending on your age and risks, examples might include screening for diabetes; hepatitis C; HIV; and cervical, breast, lung, and colon cancer. Screening helps find diseases before any symptoms appear. Eat healthy foods. Choose fruits, vegetables, whole grains, lean protein, and low-fat dairy foods. Limit saturated fat and reduce salt. Limit alcohol. Men should have no more than 2 drinks a day. Women should have no more than 1. For some people, no alcohol is the best choice. Exercise. Get at least 30 minutes of exercise on most days of the week. Walking can be a good choice. Reach and stay at your healthy weight. This will lower your risk for many health problems. Take care of your mental health. Try to stay connected with friends, family, and community, and find ways to manage stress. If you're feeling depressed or hopeless, talk to someone. A counselor can help. If you don't have a counselor, talk to your doctor. Talk to your doctor if you think you may have a problem with alcohol or drug use. This includes prescription medicines and illegal drugs. Avoid tobacco and nicotine: Don't smoke, vape, or chew. If you need help quitting, talk to your doctor. Practice safer sex. Getting tested, using condoms or dental dams, and limiting sex partners can help prevent STIs. Use birth control if it's important to you to prevent pregnancy. Talk with your doctor about your choices and what might be best for you. Prevent problems where you can. Protect your skin from too much sun, wash your hands, brush your teeth twice a day, and wear a seat belt in the car. Where can you learn more? Go to http://www.roberts.com/ and enter P072 to learn more about \"Well Visit, Ages 25 to 72: Care Instructions. \"  Current as of: March 9, 2022               Content Version: 13.5  © 2255-5949 Healthwise, Incorporated. Care instructions adapted under license by [facility] ([facility]). If you have questions about a medical condition or this instruction, always ask your healthcare professional. [place] [address] any warranty or liability for your use of this information. A Healthy Lifestyle: Care Instructions  A healthy lifestyle can help you feel good, have more energy, and stay at a weight that's healthy for you. You can share a healthy lifestyle with your friends and family. And you can do it on your own. Eat meals with your friends or family.  You could try cooking together. Plan activities with other people. Go for a walk with a friend, try a free online fitness class, or join a sports league. Eat a variety of healthy foods. These include fruits, vegetables, whole grains, low-fat dairy, and lean protein. Choose healthy portions of food. You can use the Nutrition Facts label on food packages as a guide. Eat more fruits and vegetables. You could add vegetables to sandwiches or add fruit to cereal.   Drink water when you are thirsty. Limit soda, juice, and sports drinks. Try to exercise most days. Aim for at least 2½ hours of exercise each week. Keep moving. Work in the garden or take your dog on a walk. Use the stairs instead of the elevator. If you use tobacco or nicotine, try to quit. Ask your doctor about programs and medicines to help you quit. Limit alcohol. Men should have no more than 2 drinks a day. Women should have no more than 1. For some people, no alcohol is the best choice. Follow-up care is a key part of your treatment and safety. Be sure to make and go to all appointments, and call your doctor if you are having problems. It's also a good idea to know your test results and keep a list of the medicines you take. Where can you learn more? Go to http://www.roberts.com/ and enter U807 to learn more about \"A Healthy Lifestyle: Care Instructions. \"  Current as of: March 9, 2022               Content Version: 13.5  © 4827-6704 Healthwise, Incorporated. Care instructions adapted under license by [facility] ([facility]). If you have questions about a medical condition or this instruction, always ask your healthcare professional. [address] any warranty or liability for your use of this information. Patient voices understanding and agrees to plans along with risks and benefits of plan. Counseling:  Geovanny Heaton's case, medications and options were discussed in detail.  patient was instructed to call the office if she   questions regarding her treatment. Should her conditions worsen, she should return to office to be reassessed by Dr. Alejandro Armenta. she  Should to go the closest Emergency Department for any emergency. They verbalized understanding the above instructions.

## 2023-06-23 ENCOUNTER — OFFICE VISIT (OUTPATIENT)
Dept: OBGYN CLINIC | Age: 58
End: 2023-06-23
Payer: COMMERCIAL

## 2023-06-23 VITALS
HEART RATE: 79 BPM | BODY MASS INDEX: 21.19 KG/M2 | HEIGHT: 67 IN | DIASTOLIC BLOOD PRESSURE: 83 MMHG | SYSTOLIC BLOOD PRESSURE: 134 MMHG | WEIGHT: 135 LBS

## 2023-06-23 DIAGNOSIS — Z12.31 ENCOUNTER FOR SCREENING MAMMOGRAM FOR MALIGNANT NEOPLASM OF BREAST: ICD-10-CM

## 2023-06-23 DIAGNOSIS — M81.0 OSTEOPOROSIS WITHOUT CURRENT PATHOLOGICAL FRACTURE, UNSPECIFIED OSTEOPOROSIS TYPE: ICD-10-CM

## 2023-06-23 DIAGNOSIS — Z01.419 WELL WOMAN EXAM WITH ROUTINE GYNECOLOGICAL EXAM: Primary | ICD-10-CM

## 2023-06-23 DIAGNOSIS — Z12.4 SCREENING FOR CERVICAL CANCER: ICD-10-CM

## 2023-06-23 DIAGNOSIS — Z78.0 POSTMENOPAUSAL: ICD-10-CM

## 2023-06-23 DIAGNOSIS — Z11.51 SCREENING FOR HUMAN PAPILLOMAVIRUS (HPV): ICD-10-CM

## 2023-06-23 DIAGNOSIS — Z76.0 MEDICATION REFILL: ICD-10-CM

## 2023-06-23 LAB — HPV16+18+H RISK 12 DNA SPEC-IMP: NORMAL

## 2023-06-23 PROCEDURE — 99396 PREV VISIT EST AGE 40-64: CPT | Performed by: NURSE PRACTITIONER

## 2023-06-23 RX ORDER — CONJUGATED ESTROGENS 0.62 MG/G
0.5 CREAM VAGINAL
Qty: 30 G | Refills: 3 | Status: SHIPPED | OUTPATIENT
Start: 2023-06-26

## 2023-06-23 ASSESSMENT — ENCOUNTER SYMPTOMS
RESPIRATORY NEGATIVE: 1
GASTROINTESTINAL NEGATIVE: 1
EYES NEGATIVE: 1
ALLERGIC/IMMUNOLOGIC NEGATIVE: 1

## 2023-06-23 NOTE — PROGRESS NOTES
Taryn Vidal is a 65 y.o. female who presents today for her medical conditions/ complaints as noted below. Taryn Vidal is c/o of Annual Exam        HPI  Pt presents today for pap smear and breast exam.  No complaints today. Last mammogram:  06/2022  Last pap smear:  04/2022  Contraception:  postmeno  Last bone density:  06/2021  Last colonoscopy: 2020  Patient's last menstrual period was 11/01/2017 (exact date).   [id number]    Past Medical History:   Diagnosis Date    C. difficile colitis 2022    Cerumen impaction     Recurrent    Inflammatory arthritis 2022    Left knee, s/p infection    Nosebleed     Osteoporosis     Thyroid nodule      Past Surgical History:   Procedure Laterality Date    BREAST CYST ASPIRATION Right 2007    BREAST CYST ASPIRATION  2016    BREAST CYST ASPIRATION  2015    COLONOSCOPY N/A 2/10/2020    Dr Latoya Bonilla, 5 yr recall    DILATION AND CURETTAGE OF UTERUS  1991    LAPAROSCOPIC APPENDECTOMY N/A 4/17/2022    APPENDECTOMY LAPAROSCOPIC performed by Ariel Spring, DO at [facility]    ME COLONOSCOPY FLX DX W/COLLJ SPEC WHEN PFRMD N/A 12/8/2016    Dr Mancia Malaika bleeding small to moderated sized internal hemorrhoids, tubulovillous AP, (- ) dysplasia--3 yr recall    US BREAST FINE NEEDLE ASPIRATION Left 2016     Family History   Problem Relation Age of Onset    High Blood Pressure Mother     High Cholesterol Mother     Immunodeficiency Mother         low IGG    Osteoporosis Mother         Early 46s    High Blood Pressure Father     High Cholesterol Father     Prostate Cancer Father     Breast Cancer Maternal Grandmother [de-identified]    High Cholesterol Paternal Grandmother     Heart Disease Paternal Grandfather      Social History     Tobacco Use    Smoking status: Never    Smokeless tobacco: Never   Substance Use Topics    Alcohol use: Yes     Comment: occasionally       Current Outpatient Medications   Medication Sig Dispense Refill    [START ON 6/26/2023] estrogens conjugated (PREMARIN) 0.625 MG/GM

## 2023-06-29 LAB
HPV HR 12 DNA SPEC QL NAA+PROBE: NOT DETECTED
HPV16 DNA SPEC QL NAA+PROBE: NOT DETECTED
HPV16+18+H RISK 12 DNA SPEC-IMP: NORMAL
HPV18 DNA SPEC QL NAA+PROBE: NOT DETECTED

## 2023-06-30 ENCOUNTER — HOSPITAL ENCOUNTER (OUTPATIENT)
Dept: WOMENS IMAGING | Age: 58
Discharge: HOME | End: 2023-06-30
Payer: COMMERCIAL

## 2023-06-30 DIAGNOSIS — Z12.31 ENCOUNTER FOR SCREENING MAMMOGRAM FOR MALIGNANT NEOPLASM OF BREAST: ICD-10-CM

## 2023-06-30 DIAGNOSIS — Z01.419 WELL WOMAN EXAM WITH ROUTINE GYNECOLOGICAL EXAM: ICD-10-CM

## 2023-06-30 DIAGNOSIS — Z78.0 POSTMENOPAUSAL: ICD-10-CM

## 2023-06-30 PROCEDURE — 77063 BREAST TOMOSYNTHESIS BI: CPT

## 2023-06-30 PROCEDURE — 77080 DXA BONE DENSITY AXIAL: CPT

## 2023-07-10 RX ORDER — DENOSUMAB 60 MG/ML
60 INJECTION SUBCUTANEOUS ONCE
Qty: 1 ML | Refills: 2 | Status: SHIPPED | OUTPATIENT
Start: 2023-07-10 | End: 2023-07-10

## 2023-07-10 NOTE — TELEPHONE ENCOUNTER
Elysia Farley called to request a refill on her medication.       Last office visit : 1/13/2023   Next office visit : Visit date not found     Requested Prescriptions     Pending Prescriptions Disp Refills    PROLIA 60 MG/ML SOSY SC injection [Pharmacy Med Name: Rodrigo Van 60MG/ML PFS (SRX)] 1 mL 0     Sig: INJECT 60MG (1 SYRINGE) UNDER THE SKIN EVERY 6 MONTHS            Tracey Dennis MA

## 2023-10-07 NOTE — PROGRESS NOTES
Specialty Medication Service    Patient's Name: Jose Manuel Russell YOB: 1965      Jose Manuel Russell is a 63 y.o. female presenting today for Specialty Medication Service visit. Patient is prescribed SMS formulary medication, Prolia. Medication list updated. Specialty Medication: Prolia 60mg/mL PFS   Frequency: Every 6 months   Indication: Osteoporosis of multiple sites  Initially Diagnosed: 6/2021  Additional Therapy:   Calcium  Vitamin D  Previous Therapy:   Alendronate - bone pain (leg)    Specialist:   Al Aj MD  [address]  [facility], [address] [place] [address]  [phone]  Specialist Progress Note Available: Yes  Last Specialist Visit:   1/13/2023 - Patient had first Prolia injection for treatment of osteoporosis in July 2022 and she is due for next injection now. Continue Prolia. Allergies   Allergen Reactions    Alendronate Other (See Comments)     Bone pain    Percocet [Oxycodone-Acetaminophen] Nausea Only        Past Medical History:   Diagnosis Date    C. difficile colitis 2022    Cerumen impaction     Recurrent    Inflammatory arthritis 2022    Left knee, s/p infection    Nosebleed     Osteoporosis     Thyroid nodule       Social History     Tobacco Use    Smoking status: Never    Smokeless tobacco: Never   Substance Use Topics    Alcohol use: Yes     Comment: occasionally     Family History   Problem Relation Age of Onset    High Blood Pressure Mother     High Cholesterol Mother     Immunodeficiency Mother         low IGG    Osteoporosis Mother         Early 46s    High Blood Pressure Father     High Cholesterol Father     Prostate Cancer Father     Breast Cancer Maternal Grandmother 80    High Cholesterol Paternal Grandmother     Heart Disease Paternal Grandfather        REVIEW OF CURRENT DISEASE STATE  Osteoporosis: Patient with diagnosis of osteoporosis. She was diagnosed with osteoporosis by bone density scan in 6/2021.  Previous DEXA showed

## 2023-10-13 ENCOUNTER — PHARMACY VISIT (OUTPATIENT)
Dept: PHARMACY | Facility: HOSPITAL | Age: 58
End: 2023-10-13

## 2023-10-13 ENCOUNTER — TELEPHONE (OUTPATIENT)
Dept: PRIMARY CARE CLINIC | Age: 58
End: 2023-10-13

## 2023-10-13 ENCOUNTER — ENROLLMENT (OUTPATIENT)
Dept: PHARMACY | Facility: HOSPITAL | Age: 58
End: 2023-10-13

## 2023-10-13 DIAGNOSIS — M81.0 OSTEOPOROSIS OF MULTIPLE SITES: Primary | ICD-10-CM

## 2023-10-13 RX ORDER — M-VIT,TX,IRON,MINS/CALC/FOLIC 27MG-0.4MG
1 TABLET ORAL DAILY
COMMUNITY

## 2023-10-13 NOTE — TELEPHONE ENCOUNTER
Specialty Medication Service    Date: 10/13/2023  Patient's Name: Kristei Babin YOB: 1965            _____________________________________________________________________________________________    Dr. Abilio Butcher,    Patient has been prescribed a medication currently on the [place] (SMS) formulary - Prolia. She currently has [address] benefits and is required to enroll in the SMS program.     A referral from your office is required to remain compliant with the SMS program. A referral has been pended for your convenience. If you have any questions, please feel free to contact our department at [phone] option 4.     Thank you for your collaboration,  [facility] Specialty Medication Service  Telephone: [phone] option 4   Fax: [phone]  Email: [email or web address]

## 2024-01-02 ENCOUNTER — TELEPHONE (OUTPATIENT)
Dept: PHARMACY | Facility: HOSPITAL | Age: 59
End: 2024-01-02

## 2024-01-02 NOTE — TELEPHONE ENCOUNTER
Specialty Medication Service    Date: 1/2/2024  Patient's Name: Minal Heaton YOB: 1965            _____________________________________________________________________________________________    Email received from [facility] in regard to current SMS formulary medication, Prolia. Initial SMS override placed. Patient has had [facility] initial. Waiting to be scheduled with MD. Christy Fournier [facility]  Pharmacy   Specialty Medication Services   Phone: [phone] option 4    For Pharmacy Admin Tracking Only    Program: SMS  CPA in place:  Yes  Recommendation Provided To: Pharmacy: 1  Intervention Detail: Benefit Assistance  Intervention Accepted By: Pharmacy: 1  Gap Closed?:    Time Spent (min): 15

## 2024-01-16 ENCOUNTER — TELEPHONE (OUTPATIENT)
Dept: PHARMACY | Facility: HOSPITAL | Age: 59
End: 2024-01-16

## 2024-01-16 NOTE — TELEPHONE ENCOUNTER
Specialty Medication Service    Date: 1/16/2024  Patient's Name: Minal Heaton YOB: 1965            _____________________________________________________________________________________________   Called pt to schedule SMS MD appt.  Left message for pt to call me back at [phone].    Valeriy Canales, Pharmacist  Specialty Medication Services    For Pharmacy Admin Tracking Only    Program: [facility]  CPA in place:  No  Recommendation Provided To: Patient/Caregiver: 1 via Telephone  Intervention Detail: Scheduled Appointment  Intervention Accepted By: Patient/Caregiver: 0  Time Spent (min): 10

## 2024-02-02 ENCOUNTER — OFFICE VISIT (OUTPATIENT)
Dept: PRIMARY CARE CLINIC | Age: 59
End: 2024-02-02
Payer: COMMERCIAL

## 2024-02-02 VITALS
SYSTOLIC BLOOD PRESSURE: 122 MMHG | BODY MASS INDEX: 21.85 KG/M2 | DIASTOLIC BLOOD PRESSURE: 78 MMHG | HEART RATE: 72 BPM | OXYGEN SATURATION: 98 % | TEMPERATURE: 98 F | WEIGHT: 139.5 LBS

## 2024-02-02 DIAGNOSIS — Z13.29 SCREENING FOR THYROID DISORDER: ICD-10-CM

## 2024-02-02 DIAGNOSIS — M81.0 OSTEOPOROSIS OF MULTIPLE SITES: ICD-10-CM

## 2024-02-02 DIAGNOSIS — Z00.00 ENCOUNTER FOR WELL ADULT EXAM WITHOUT ABNORMAL FINDINGS: Primary | ICD-10-CM

## 2024-02-02 DIAGNOSIS — Z13.0 SCREENING FOR DEFICIENCY ANEMIA: ICD-10-CM

## 2024-02-02 DIAGNOSIS — E55.9 VITAMIN D DEFICIENCY: ICD-10-CM

## 2024-02-02 DIAGNOSIS — H61.23 BILATERAL IMPACTED CERUMEN: ICD-10-CM

## 2024-02-02 DIAGNOSIS — M85.852 OSTEOPENIA OF LEFT HIP: ICD-10-CM

## 2024-02-02 DIAGNOSIS — Z13.220 SCREENING FOR HYPERLIPIDEMIA: ICD-10-CM

## 2024-02-02 PROCEDURE — 99396 PREV VISIT EST AGE 40-64: CPT | Performed by: INTERNAL MEDICINE

## 2024-02-02 NOTE — PROGRESS NOTES
04/21/2022    GLOB 2.7 11/18/2016     Lab Results   Component Value Date    VITD25 60.8 01/13/2023      Lab Results   Component Value Date    CALCIUM 9.9 01/13/2023    PHOS 3.8 01/13/2023         Assessment   Plan   1. Encounter for well adult exam without abnormal findings  -     CBC with Auto Differential; Future  -     Comprehensive Metabolic Panel; Future  2. Osteoporosis of multiple sites  -     External Referral To Rheumatology  -     Vitamin D 25 Hydroxy; Future  -     Phosphorus; Future  3. Osteopenia of left hip  -     External Referral To Rheumatology  -     Vitamin D 25 Hydroxy; Future  -     Phosphorus; Future  4. Bilateral impacted cerumen  -     Ear wax removal  5. Vitamin D deficiency  -     Vitamin D 25 Hydroxy; Future  6. Screening for deficiency anemia  -     CBC with Auto Differential; Future  7. Screening for hyperlipidemia  -     Lipid Panel; Future  8. Screening for thyroid disorder  -     T4, Free; Future  -     TSH; Future         Personalized Preventive Plan   Current Health Maintenance Status  Immunization History   Administered Date(s) Administered    COVID-19, PFIZER Bivalent, DO NOT Dilute, (age 12y+), IM, 30 mcg/0.3 mL 12/02/2022    COVID-19, PFIZER PURPLE top, DILUTE for use, (age 12 y+), 30mcg/0.3mL 12/17/2020, 01/08/2021, 10/19/2021    DTaP 11/06/2008    Influenza Vaccine, unspecified formulation 10/27/2016    Influenza Virus Vaccine 10/10/2019, 10/22/2020, 10/07/2021, 10/14/2022, 10/12/2023    Influenza, AFLURIA (age 3 yrs+), FLUZONE, (age 6 mo+), MDV, 0.5mL 10/18/2018    Influenza, FLUARIX, FLULAVAL, FLUZONE (age 6 mo+) AND AFLURIA, (age 3 y+), PF, 0.5mL 10/06/2017    Influenza, FLUBLOK, (age 18 y+), PF, 0.5mL 10/01/2019    TDaP, ADACEL (age 10y-64y), BOOSTRIX (age 10y+), IM, 0.5mL 01/01/2008, 11/06/2018    Zoster Recombinant (Shingrix) 01/01/2020, 09/01/2020        Health Maintenance   Topic Date Due    COVID-19 Vaccine (5 - 2023-24 season) 09/01/2023    Depression Screen

## 2024-02-02 NOTE — PATIENT INSTRUCTIONS
corn, and sesame; can decrease total cholesterol and LDL cholesterol   Omega-3 fatty acids  particularly those found in fatty fish (such as salmon, trout, tuna, mackerel, herring, and sardines); can decrease risk of arrhythmias, decrease triglyceride levels, and slightly lower blood pressure   The fats that you want to limit are:   Saturated fat  found in animal products, many fast foods, and a few vegetables; increases total blood cholesterol, including LDL levels   Animal fats that are saturated include: butter, lard, whole-milk dairy products, meat fat, and poultry skin   Vegetable fats that are saturated include: hydrogenated shortening, palm oil, coconut oil, cocoa butter   Hydrogenated or trans fat  found in margarine and vegetable shortening, most shelf stable snack foods, and fried foods; increases LDL and decreases HDL     It is generally recommended that you limit your total fat for the day to less than 30% of your total calories. If you follow an 1800-calorie heart healthy diet, for example, this would mean 60 grams of fat or less per day.   Saturated fat and trans fat in your diet raises your blood cholesterol the most, much more than dietary cholesterol does. For this reason, on a heart-healthy diet, less than 7% of your calories should come from saturated fat and ideally 0% from trans fat. On an 1800-calorie diet, this translates into less than 14 grams of saturated fat per day, leaving 46 grams of fat to come from mono- and polyunsaturated fats.   Food Choices on a Heart Healthy Diet   Food Category   Foods Recommended   Foods to Avoid   Grains   Breads and rolls without salted tops Most dry and cooked cereals Unsalted crackers and breadsticks Low-sodium or homemade breadcrumbs or stuffing All rice and pastas   Breads, rolls, and crackers with salted tops High-fat baked goods (eg, muffins, donuts, pastries) Quick breads, self-rising flour, and biscuit mixes Regular bread crumbs Instant hot cereals

## 2024-02-28 ENCOUNTER — PHARMACY VISIT (OUTPATIENT)
Dept: PHARMACY | Facility: HOSPITAL | Age: 59
End: 2024-02-28

## 2024-02-28 DIAGNOSIS — M81.0 OSTEOPOROSIS OF MULTIPLE SITES: Primary | ICD-10-CM

## 2024-02-28 RX ORDER — DENOSUMAB 60 MG/ML
60 INJECTION SUBCUTANEOUS ONCE
Qty: 1 ML | Refills: 0 | Status: SHIPPED | OUTPATIENT
Start: 2024-02-28 | End: 2024-02-28

## 2024-02-28 NOTE — PROGRESS NOTES
Initial Specialty Medication Virtual Visit  [facility]  [facility]  [address] [facility] [address]  Dept: [phone]  Dept Fax: [phone]  Loc: [phone]  Date of patient's visit: 2/28/2024  Patient's Name:  Minal Heaton YOB: 1965            Patient Care Team:  Maggy Landaverde MD as PCP - General (Pediatrics)  Maggy Landaverde MD as PCP - Empaneled Provider  ================================================================    REASON FOR VISIT/CHIEF COMPLAINT:  Osteoporosis    HISTORY OF PRESENTING ILLNESS:  Minal Heaton is 58 y.o. is here for initial virtual visit for specialty medication.      Prolia    Specialty Medication: Prolia 60mg/mL PFS   Frequency: Every 6 months   Indication: Osteoporosis of multiple sites  Initially Diagnosed: 6/2021  Additional Therapy:   Calcium  Vitamin D  Previous Therapy:   Alendronate - bone pain (leg)     Specialist:   Maggy Landaverde MD  [address], [address]  [place] [id number] [phone]  Specialist Progress Note Available: Yes  Last Specialist Visit:   1/13/2023 - Patient had first Prolia injection for treatment of osteoporosis in July 2022 and she is due for next injection now. Continue Prolia       Osteoporosis:  She was diagnosed with osteoporosis by bone density scan in 6/2021. She had known osteopenia demonstrated on DEXA prior to this. She was unable to tolerate Bisphosphonates due to bone pain. She has been on calcium and vitamin D supplementation since her 30's due to her higher risk with her mother having this. She has not had any fractures. She did have mild improved of her T score on her last DEXA scan, but this still remains in osteoporosis range. We discussed when do we need to consider changing to a different agent. I feel it is reasonable to repeat a DEXA scan at one year to assess if she has had further improvement of her T score. If this has

## 2024-03-18 ENCOUNTER — TELEPHONE (OUTPATIENT)
Dept: PHARMACY | Facility: HOSPITAL | Age: 59
End: 2024-03-18

## 2024-03-18 NOTE — TELEPHONE ENCOUNTER
Specialty Medication Service    Date: 3/18/2024  Patient's Name: Minal Heaton YOB: 1965            _____________________________________________________________________________________________    Left message to schedule PharmD follow up appointment for Specialty Medication Services. Please call: [phone] option [phone]. Will continue to outreach as appropriate. Pt can also call my direct office phone number at [phone].    Of note, if pt would like to push out f/u PharmD visits to once yearly, that is acceptable.  Pt's labs prior to next Prolia injection in July are already ordered by specialist.      Valeriy Canales, Pharmacist  Specialty Medication Services    For Pharmacy Admin Tracking Only    Program: [facility]  CPA in place:  Yes  Recommendation Provided To: Patient/Caregiver: 1 via Telephone  Intervention Detail: Scheduled Appointment  Intervention Accepted By: Patient/Caregiver: 0  Time Spent (min): 15

## 2024-04-16 LAB
CHOLEST SERPL-MCNC: 229 MG/DL (ref 160–199)
GLUCOSE SERPL-MCNC: 100 MG/DL (ref 74–109)
HDLC SERPL-MCNC: 80 MG/DL (ref 65–121)
LDLC SERPL CALC-MCNC: 134 MG/DL
TRIGL SERPL-MCNC: 77 MG/DL (ref 0–149)

## 2024-06-10 DIAGNOSIS — Z13.220 SCREENING FOR HYPERLIPIDEMIA: ICD-10-CM

## 2024-06-10 DIAGNOSIS — M85.852 OSTEOPENIA OF LEFT HIP: ICD-10-CM

## 2024-06-10 DIAGNOSIS — Z13.29 SCREENING FOR THYROID DISORDER: ICD-10-CM

## 2024-06-10 DIAGNOSIS — Z00.00 ENCOUNTER FOR WELL ADULT EXAM WITHOUT ABNORMAL FINDINGS: ICD-10-CM

## 2024-06-10 DIAGNOSIS — Z13.0 SCREENING FOR DEFICIENCY ANEMIA: ICD-10-CM

## 2024-06-10 DIAGNOSIS — M81.0 OSTEOPOROSIS OF MULTIPLE SITES: ICD-10-CM

## 2024-06-10 DIAGNOSIS — E55.9 VITAMIN D DEFICIENCY: ICD-10-CM

## 2024-06-10 LAB
25(OH)D3 SERPL-MCNC: 58.2 NG/ML
ALBUMIN SERPL-MCNC: 4.3 G/DL (ref 3.5–5.2)
ALP SERPL-CCNC: 58 U/L (ref 35–104)
ALT SERPL-CCNC: 15 U/L (ref 5–33)
ANION GAP SERPL CALCULATED.3IONS-SCNC: 10 MMOL/L (ref 7–19)
AST SERPL-CCNC: 15 U/L (ref 5–32)
BASOPHILS # BLD: 0.1 K/UL (ref 0–0.2)
BASOPHILS NFR BLD: 1 % (ref 0–1)
BILIRUB SERPL-MCNC: 0.2 MG/DL (ref 0.2–1.2)
BUN SERPL-MCNC: 16 MG/DL (ref 6–20)
CALCIUM SERPL-MCNC: 9.7 MG/DL (ref 8.6–10)
CHLORIDE SERPL-SCNC: 102 MMOL/L (ref 98–111)
CO2 SERPL-SCNC: 27 MMOL/L (ref 22–29)
CREAT SERPL-MCNC: 0.6 MG/DL (ref 0.5–0.9)
EOSINOPHIL # BLD: 0.5 K/UL (ref 0–0.6)
EOSINOPHIL NFR BLD: 7.7 % (ref 0–5)
ERYTHROCYTE [DISTWIDTH] IN BLOOD BY AUTOMATED COUNT: 11.9 % (ref 11.5–14.5)
GLUCOSE SERPL-MCNC: 93 MG/DL (ref 74–109)
HCT VFR BLD AUTO: 43.1 % (ref 37–47)
HGB BLD-MCNC: 14 G/DL (ref 12–16)
IMM GRANULOCYTES # BLD: 0 K/UL
LYMPHOCYTES # BLD: 2.5 K/UL (ref 1.1–4.5)
LYMPHOCYTES NFR BLD: 37 % (ref 20–40)
MCH RBC QN AUTO: 30.4 PG (ref 27–31)
MCHC RBC AUTO-ENTMCNC: 32.5 G/DL (ref 33–37)
MCV RBC AUTO: 93.7 FL (ref 81–99)
MONOCYTES # BLD: 0.6 K/UL (ref 0–0.9)
MONOCYTES NFR BLD: 8.3 % (ref 0–10)
NEUTROPHILS # BLD: 3.1 K/UL (ref 1.5–7.5)
NEUTS SEG NFR BLD: 45.9 % (ref 50–65)
PHOSPHATE SERPL-MCNC: 3.2 MG/DL (ref 2.5–4.5)
PLATELET # BLD AUTO: 320 K/UL (ref 130–400)
PMV BLD AUTO: 9.4 FL (ref 9.4–12.3)
POTASSIUM SERPL-SCNC: 4.1 MMOL/L (ref 3.5–5)
PROT SERPL-MCNC: 7 G/DL (ref 6.6–8.7)
RBC # BLD AUTO: 4.6 M/UL (ref 4.2–5.4)
SODIUM SERPL-SCNC: 139 MMOL/L (ref 136–145)
T4 FREE SERPL-MCNC: 1.05 NG/DL (ref 0.93–1.7)
TSH SERPL DL<=0.005 MIU/L-ACNC: 1.67 UIU/ML (ref 0.27–4.2)
WBC # BLD AUTO: 6.7 K/UL (ref 4.8–10.8)

## 2024-06-17 SDOH — ECONOMIC STABILITY: TRANSPORTATION INSECURITY
IN THE PAST 12 MONTHS, HAS LACK OF TRANSPORTATION KEPT YOU FROM MEETINGS, WORK, OR FROM GETTING THINGS NEEDED FOR DAILY LIVING?: NO

## 2024-06-17 SDOH — ECONOMIC STABILITY: FOOD INSECURITY: WITHIN THE PAST 12 MONTHS, YOU WORRIED THAT YOUR FOOD WOULD RUN OUT BEFORE YOU GOT MONEY TO BUY MORE.: NEVER TRUE

## 2024-06-17 SDOH — ECONOMIC STABILITY: HOUSING INSECURITY
IN THE LAST 12 MONTHS, WAS THERE A TIME WHEN YOU DID NOT HAVE A STEADY PLACE TO SLEEP OR SLEPT IN A SHELTER (INCLUDING NOW)?: NO

## 2024-06-17 SDOH — ECONOMIC STABILITY: FOOD INSECURITY: WITHIN THE PAST 12 MONTHS, THE FOOD YOU BOUGHT JUST DIDN'T LAST AND YOU DIDN'T HAVE MONEY TO GET MORE.: NEVER TRUE

## 2024-06-17 SDOH — ECONOMIC STABILITY: INCOME INSECURITY: HOW HARD IS IT FOR YOU TO PAY FOR THE VERY BASICS LIKE FOOD, HOUSING, MEDICAL CARE, AND HEATING?: NOT HARD AT ALL

## 2024-06-17 ASSESSMENT — PATIENT HEALTH QUESTIONNAIRE - PHQ9
1. LITTLE INTEREST OR PLEASURE IN DOING THINGS: NOT AT ALL
2. FEELING DOWN, DEPRESSED OR HOPELESS: NOT AT ALL
1. LITTLE INTEREST OR PLEASURE IN DOING THINGS: NOT AT ALL
SUM OF ALL RESPONSES TO PHQ QUESTIONS 1-9: 0
SUM OF ALL RESPONSES TO PHQ QUESTIONS 1-9: 0
SUM OF ALL RESPONSES TO PHQ9 QUESTIONS 1 & 2: 0
SUM OF ALL RESPONSES TO PHQ QUESTIONS 1-9: 0
SUM OF ALL RESPONSES TO PHQ QUESTIONS 1-9: 0
SUM OF ALL RESPONSES TO PHQ9 QUESTIONS 1 & 2: 0
2. FEELING DOWN, DEPRESSED OR HOPELESS: NOT AT ALL

## 2024-06-18 ENCOUNTER — OFFICE VISIT (OUTPATIENT)
Dept: PRIMARY CARE CLINIC | Age: 59
End: 2024-06-18
Payer: COMMERCIAL

## 2024-06-18 VITALS
BODY MASS INDEX: 21.5 KG/M2 | SYSTOLIC BLOOD PRESSURE: 110 MMHG | OXYGEN SATURATION: 99 % | HEIGHT: 67 IN | WEIGHT: 137 LBS | DIASTOLIC BLOOD PRESSURE: 64 MMHG | HEART RATE: 74 BPM | TEMPERATURE: 98 F

## 2024-06-18 DIAGNOSIS — R10.84 GENERALIZED ABDOMINAL PAIN: Primary | ICD-10-CM

## 2024-06-18 PROCEDURE — 99213 OFFICE O/P EST LOW 20 MIN: CPT | Performed by: NURSE PRACTITIONER

## 2024-06-18 NOTE — PROGRESS NOTES
Ms.Deborah Heaton is a 58 y.o. female who presents today for  Chief Complaint   Patient presents with    Abdominal Pain     Started 3 weeks ago. She says it is intermittent.        HPI:  Patient here today today with concerns of generalized abdominal pain that has been intermittent over the past three weeks. Patient states on occurences when she ate foods such as ribs and popcorn that the pain was exacerbated. She has had some associated belching, and denies any past history of GERD. She denies any radiating pain, fever, chills, nausea or vomiting.     Review of Systems   Constitutional:  Negative for activity change and fever.   HENT:  Negative for congestion, ear pain and sore throat.    Respiratory:  Negative for cough, chest tightness and shortness of breath.    Cardiovascular:  Negative for chest pain.   Gastrointestinal:  Positive for abdominal pain. Negative for diarrhea, nausea and vomiting.   Genitourinary:  Negative for frequency and urgency.   Musculoskeletal:  Negative for arthralgias and myalgias.   Skin:  Negative for color change.   Neurological:  Negative for dizziness, weakness and numbness.   Psychiatric/Behavioral:  Negative for agitation. The patient is not nervous/anxious.        Past Medical History:   Diagnosis Date    C. difficile colitis 2022    Cerumen impaction     Recurrent    Inflammatory arthritis 2022    Left knee, s/p infection    Nosebleed     Osteoporosis     Thyroid nodule        Current Outpatient Medications   Medication Sig Dispense Refill    denosumab (PROLIA) 60 MG/ML SOSY SC injection Inject 1 mL into the skin once for 1 dose 1 mL 0    Multiple Vitamins-Minerals (THERAPEUTIC MULTIVITAMIN-MINERALS) tablet Take 1 tablet by mouth daily      estrogens conjugated (PREMARIN) 0.625 MG/GM CREA vaginal cream Place 0.5 g vaginally Twice a Week Use nightly at bedtime for 2 weeks, then twice weekly at bedtime (Patient taking differently: Place 0.5 g vaginally Twice a Week Use nightly at

## 2024-06-20 ASSESSMENT — ENCOUNTER SYMPTOMS
ABDOMINAL PAIN: 1
COLOR CHANGE: 0
SORE THROAT: 0
DIARRHEA: 0
CHEST TIGHTNESS: 0
SHORTNESS OF BREATH: 0
VOMITING: 0
NAUSEA: 0
COUGH: 0

## 2024-06-21 ENCOUNTER — HOSPITAL ENCOUNTER (OUTPATIENT)
Dept: ULTRASOUND IMAGING | Age: 59
Discharge: HOME | End: 2024-06-21
Payer: COMMERCIAL

## 2024-06-21 ENCOUNTER — TELEPHONE (OUTPATIENT)
Dept: PHARMACY | Facility: HOSPITAL | Age: 59
End: 2024-06-21

## 2024-06-21 DIAGNOSIS — R10.84 GENERALIZED ABDOMINAL PAIN: ICD-10-CM

## 2024-06-21 PROCEDURE — 76705 ECHO EXAM OF ABDOMEN: CPT

## 2024-06-21 NOTE — TELEPHONE ENCOUNTER
Specialty Medication Service    Date: 6/21/2024  Patient's Name: Minal Heaton YOB: 1965            _____________________________________________________________________________________________    PA for patient's Prolia submitted to payer for approval. CM key: [id number]. Will continue to monitor for PA determination.     Valeriy Canales, Pharmacist  Specialty Medication Services    For Pharmacy Admin Tracking Only    Program: [facility]  CPA in place:  Yes  Recommendation Provided To: Other: 1  Intervention Detail: Benefit Assistance  Intervention Accepted By: Pharmacy: 0  Time Spent (min): 15

## 2024-06-27 ENCOUNTER — TELEPHONE (OUTPATIENT)
Dept: PRIMARY CARE CLINIC | Age: 59
End: 2024-06-27

## 2024-06-27 NOTE — TELEPHONE ENCOUNTER
----- Message from Maggy Landaverde MD sent at 6/27/2024 12:57 PM CDT -----  Patient's gallbladder ultrasound was normal

## 2024-06-28 ENCOUNTER — HOSPITAL ENCOUNTER (OUTPATIENT)
Dept: WOMENS IMAGING | Age: 59
Discharge: HOME | End: 2024-06-28
Payer: COMMERCIAL

## 2024-06-28 ENCOUNTER — OFFICE VISIT (OUTPATIENT)
Dept: OBGYN CLINIC | Age: 59
End: 2024-06-28
Payer: COMMERCIAL

## 2024-06-28 VITALS
WEIGHT: 134.8 LBS | BODY MASS INDEX: 21.16 KG/M2 | HEIGHT: 67 IN | HEART RATE: 75 BPM | SYSTOLIC BLOOD PRESSURE: 110 MMHG | DIASTOLIC BLOOD PRESSURE: 75 MMHG

## 2024-06-28 DIAGNOSIS — Z01.419 WELL WOMAN EXAM WITH ROUTINE GYNECOLOGICAL EXAM: Primary | ICD-10-CM

## 2024-06-28 DIAGNOSIS — Z76.0 MEDICATION REFILL: ICD-10-CM

## 2024-06-28 DIAGNOSIS — Z12.31 ENCOUNTER FOR SCREENING MAMMOGRAM FOR MALIGNANT NEOPLASM OF BREAST: ICD-10-CM

## 2024-06-28 DIAGNOSIS — Z11.51 SCREENING FOR HUMAN PAPILLOMAVIRUS (HPV): ICD-10-CM

## 2024-06-28 DIAGNOSIS — N81.4 UTERINE PROLAPSE: ICD-10-CM

## 2024-06-28 DIAGNOSIS — N81.4 CYSTOCELE WITH PROLAPSE: ICD-10-CM

## 2024-06-28 DIAGNOSIS — Z12.4 SCREENING FOR CERVICAL CANCER: ICD-10-CM

## 2024-06-28 PROCEDURE — 77063 BREAST TOMOSYNTHESIS BI: CPT

## 2024-06-28 PROCEDURE — 99396 PREV VISIT EST AGE 40-64: CPT | Performed by: NURSE PRACTITIONER

## 2024-06-28 RX ORDER — CONJUGATED ESTROGENS 0.62 MG/G
0.5 CREAM VAGINAL
Qty: 30 G | Refills: 3 | Status: SHIPPED | OUTPATIENT
Start: 2024-07-01

## 2024-06-28 ASSESSMENT — PATIENT HEALTH QUESTIONNAIRE - PHQ9
SUM OF ALL RESPONSES TO PHQ QUESTIONS 1-9: 0
SUM OF ALL RESPONSES TO PHQ QUESTIONS 1-9: 0
SUM OF ALL RESPONSES TO PHQ9 QUESTIONS 1 & 2: 0
SUM OF ALL RESPONSES TO PHQ QUESTIONS 1-9: 0
SUM OF ALL RESPONSES TO PHQ QUESTIONS 1-9: 0
2. FEELING DOWN, DEPRESSED OR HOPELESS: NOT AT ALL
1. LITTLE INTEREST OR PLEASURE IN DOING THINGS: NOT AT ALL

## 2024-06-28 ASSESSMENT — ENCOUNTER SYMPTOMS
GASTROINTESTINAL NEGATIVE: 1
ALLERGIC/IMMUNOLOGIC NEGATIVE: 1
EYES NEGATIVE: 1
RESPIRATORY NEGATIVE: 1

## 2024-06-28 NOTE — PROGRESS NOTES
Minal Heaton is a 58 y.o. female who presents today for her medical conditions/ complaints as noted below. Minal Heaton is c/o of Annual Exam        HPI  Pt presents today for pap smear and breast exam. Pt denies any complaints or concerns at this time.     Last mammogram:  23  Last pap smear: 23    Contraception: Post [place]    : 2   Para:  2  AB:  0  Last bone density: 23   Last colonoscopy: 02/10/20    Patient's last menstrual period was 2017 (exact date).      Past Medical History:   Diagnosis Date    C. difficile colitis     Cerumen impaction     Recurrent    Inflammatory arthritis     Left knee, s/p infection    Nosebleed     Osteoporosis     Thyroid nodule      Past Surgical History:   Procedure Laterality Date    BREAST CYST ASPIRATION Right 2007    BREAST CYST ASPIRATION  2016    BREAST CYST ASPIRATION  2015    COLONOSCOPY N/A 2/10/2020    Dr CARLY Anderson-Normal, 5 yr recall    DILATION AND CURETTAGE OF UTERUS      LAPAROSCOPIC APPENDECTOMY N/A 2022    APPENDECTOMY LAPAROSCOPIC performed by Lorenzo Zuñiga, DO at [facility] OR    [place] COLONOSCOPY FLX DX W/COLLJ SPEC WHEN PFRMD N/A 2016    Dr CARLY Anderson-non bleeding small to moderated sized internal hemorrhoids, tubulovillous AP, (- ) dysplasia--3 yr recall    US BREAST FINE NEEDLE ASPIRATION Left 2016     Family History   Problem Relation Age of Onset    High Blood Pressure Mother     High Cholesterol Mother     Immunodeficiency Mother         low IGG    Osteoporosis Mother         Early 50s    High Blood Pressure Father     High Cholesterol Father     Prostate Cancer Father     Breast Cancer Maternal Grandmother 80    High Cholesterol Paternal Grandmother     Heart Disease Paternal Grandfather      Social History     Tobacco Use    Smoking status: Never    Smokeless tobacco: Never   Substance Use Topics    Alcohol use: Yes     Comment: occasionally       Current Outpatient Medications   Medication Sig Dispense

## 2024-07-19 DIAGNOSIS — M81.0 OSTEOPOROSIS OF MULTIPLE SITES: ICD-10-CM

## 2024-07-19 RX ORDER — DENOSUMAB 60 MG/ML
INJECTION SUBCUTANEOUS
Qty: 1 ML | Refills: 1 | Status: SHIPPED | OUTPATIENT
Start: 2024-07-19

## 2024-10-03 ENCOUNTER — TELEPHONE (OUTPATIENT)
Dept: PHARMACY | Facility: HOSPITAL | Age: 59
End: 2024-10-03

## 2024-10-03 NOTE — TELEPHONE ENCOUNTER
Specialty Medication Service    Date: 10/3/2024  Patient's Name: Minal Heaton YOB: 1965            _____________________________________________________________________________________________    Spoke with patient to schedule PharmD follow up appointment for Specialty Medication Services. Patient scheduled 10/25/24  Most recent office notes from 2/2/2024 in patient chart.    Valeriy Canales, Pharmacist  Specialty Medication Services    For Pharmacy Admin Tracking Only    Program: [facility]  CPA in place:  Yes  Recommendation Provided To: Patient/Caregiver: 1 via Telephone  Intervention Detail: Scheduled Appointment  Intervention Accepted By: Patient/Caregiver: 1  Time Spent (min): 15

## 2024-10-25 ENCOUNTER — OFFICE VISIT (OUTPATIENT)
Age: 59
End: 2024-10-25

## 2024-10-25 ENCOUNTER — PHARMACY VISIT (OUTPATIENT)
Dept: PHARMACY | Facility: HOSPITAL | Age: 59
End: 2024-10-25

## 2024-10-25 VITALS — BODY MASS INDEX: 21.19 KG/M2 | HEIGHT: 67 IN | WEIGHT: 135 LBS

## 2024-10-25 DIAGNOSIS — M77.41 METATARSALGIA OF BOTH FEET: ICD-10-CM

## 2024-10-25 DIAGNOSIS — M79.672 PAIN IN LEFT FOOT: ICD-10-CM

## 2024-10-25 DIAGNOSIS — M79.671 RIGHT FOOT PAIN: ICD-10-CM

## 2024-10-25 DIAGNOSIS — M81.0 OSTEOPOROSIS OF MULTIPLE SITES: Primary | ICD-10-CM

## 2024-10-25 DIAGNOSIS — M79.672 LEFT FOOT PAIN: Primary | ICD-10-CM

## 2024-10-25 DIAGNOSIS — M20.12 HALLUX VALGUS (ACQUIRED), LEFT FOOT: ICD-10-CM

## 2024-10-25 DIAGNOSIS — M79.671 PAIN IN RIGHT FOOT: ICD-10-CM

## 2024-10-25 DIAGNOSIS — M24.176 JOINT DERANGEMENT OF ANKLE OR FOOT: ICD-10-CM

## 2024-10-25 DIAGNOSIS — M77.42 METATARSALGIA OF BOTH FEET: ICD-10-CM

## 2024-10-25 DIAGNOSIS — M24.173 JOINT DERANGEMENT OF ANKLE OR FOOT: ICD-10-CM

## 2024-10-25 ASSESSMENT — PROMIS GLOBAL HEALTH SCALE
IN GENERAL, WOULD YOU SAY YOUR HEALTH IS...[ON A SCALE OF 1 (POOR) TO 5 (EXCELLENT)]: EXCELLENT
IN GENERAL, WOULD YOU SAY YOUR QUALITY OF LIFE IS...[ON A SCALE OF 1 (POOR) TO 5 (EXCELLENT)]: EXCELLENT
IN THE PAST 7 DAYS, HOW WOULD YOU RATE YOUR PAIN ON AVERAGE [ON A SCALE FROM 0 (NO PAIN) TO 10 (WORST IMAGINABLE PAIN)]?: 1
IN GENERAL, HOW WOULD YOU RATE YOUR SATISFACTION WITH YOUR SOCIAL ACTIVITIES AND RELATIONSHIPS [ON A SCALE OF 1 (POOR) TO 5 (EXCELLENT)]?: EXCELLENT
IN THE PAST 7 DAYS, HOW WOULD YOU RATE YOUR FATIGUE ON AVERAGE [ON A SCALE FROM 1 (NONE) TO 5 (VERY SEVERE)]?: MILD
TO WHAT EXTENT ARE YOU ABLE TO CARRY OUT YOUR EVERYDAY PHYSICAL ACTIVITIES SUCH AS WALKING, CLIMBING STAIRS, CARRYING GROCERIES, OR MOVING A CHAIR [ON A SCALE OF 1 (NOT AT ALL) TO 5 (COMPLETELY)]?: COMPLETELY
IN GENERAL, HOW WOULD YOU RATE YOUR MENTAL HEALTH, INCLUDING YOUR MOOD AND YOUR ABILITY TO THINK [ON A SCALE OF 1 (POOR) TO 5 (EXCELLENT)]?: EXCELLENT
IN GENERAL, HOW WOULD YOU RATE YOUR PHYSICAL HEALTH [ON A SCALE OF 1 (POOR) TO 5 (EXCELLENT)]?: EXCELLENT
IN THE PAST 7 DAYS, HOW OFTEN HAVE YOU BEEN BOTHERED BY EMOTIONAL PROBLEMS, SUCH AS FEELING ANXIOUS, DEPRESSED, OR IRRITABLE [ON A SCALE FROM 1 (NEVER) TO 5 (ALWAYS)]?: NEVER
IN GENERAL, PLEASE RATE HOW WELL YOU CARRY OUT YOUR USUAL SOCIAL ACTIVITIES (INCLUDES ACTIVITIES AT HOME, AT WORK, AND IN YOUR COMMUNITY, AND RESPONSIBILITIES AS A PARENT, CHILD, SPOUSE, EMPLOYEE, FRIEND, ETC) [ON A SCALE OF 1 (POOR) TO 5 (EXCELLENT)]?: EXCELLENT
SUM OF RESPONSES TO QUESTIONS 3, 6, 7, & 8: 15
SUM OF RESPONSES TO QUESTIONS 2, 4, 5, & 10: 20

## 2024-10-25 ASSESSMENT — PATIENT HEALTH QUESTIONNAIRE - PHQ9
SUM OF ALL RESPONSES TO PHQ QUESTIONS 1-9: 0
2. FEELING DOWN, DEPRESSED OR HOPELESS: NOT AT ALL
1. LITTLE INTEREST OR PLEASURE IN DOING THINGS: NOT AT ALL
SUM OF ALL RESPONSES TO PHQ QUESTIONS 1-9: 0
SUM OF ALL RESPONSES TO PHQ9 QUESTIONS 1 & 2: 0

## 2024-10-25 NOTE — PROGRESS NOTES
Specialty Medication Service    Patient's Name: Minal Heaton YOB: 1965            Reason for visit: Minal Heaton is a 59 y.o. female presenting today for Specialty Medication Service visit follow up. Patient last seen by SMS 10/13/23. Patient continues on [facility] formulary medication, Prolia. Pharmacy completed Specialty Medication Service visit for medication monitoring and counseling. Medication list updated.    Specialty Medication: Prolia   Frequency: every 6 months  Indication: Osteoporosis of multiple sites   Initially Diagnosed: 6/2021  Additional Therapy:   Calcium-vitamin d 500-400 mg-unit 2 tabs po daily  Vitamin D 2000 units po daily  Previous Therapy:   Alendronate - bone pain (leg)     Next Prolia injection due in January 2025.    Specialist: Maggy Landaverde MD  [address], [address]  [place] [address]  [phone]  Specialist Progress Note Available: Yes  Last Specialist Visit:  2/2/2024  Office visit note from 2/2/24:  \"57 y/o WF here for annual wellness visit. Patient has been on prolia for osteoprosis and has had 6 doses so far for treatment but she has only had minimal improvement in osteoporosis in Lspine and osteopenia in her hip. She has added resistance exercises 3 days a week also. Patient has a history of vitamin D deficiency but it has been well controlled and she has been taking calcium and vitamin D supplement since she was in her 30s. Mother had fractures due to osteoporosis despite treatment IV bisphosphonate. \"    Allergies   Allergen Reactions    Alendronate Other (See Comments)     Bone pain    Percocet [Oxycodone-Acetaminophen] Nausea Only       Past Medical History:   Diagnosis Date    C. difficile colitis 2022    Cerumen impaction     Recurrent    Inflammatory arthritis 2022    Left knee, s/p infection    Nosebleed     Osteoporosis     Thyroid nodule       Social History     Tobacco Use    Smoking status: Never    Smokeless

## 2024-10-25 NOTE — PROGRESS NOTES
N/A 2/10/2020    Dr CARLY Anderson-Normal, 5 yr recall    DILATION AND CURETTAGE OF UTERUS  1991    LAPAROSCOPIC APPENDECTOMY N/A 4/17/2022    APPENDECTOMY LAPAROSCOPIC performed by Lorenzo Zuñiga, DO at [facility] OR    [place] COLONOSCOPY FLX DX W/COLLJ SPEC WHEN PFRMD N/A 12/8/2016    Dr CARLY Anderson-non bleeding small to moderated sized internal hemorrhoids, tubulovillous AP, (- ) dysplasia--3 yr recall    US BREAST FINE NEEDLE ASPIRATION Left 2016      Social History     Socioeconomic History    Marital status:      Spouse name: None    Number of children: None    Years of education: None    Highest education level: None   Tobacco Use    Smoking status: Never    Smokeless tobacco: Never   Vaping Use    Vaping status: Never Used   Substance and Sexual Activity    Alcohol use: Yes     Alcohol/week: 1.0 standard drink of alcohol     Types: 1 Glasses of wine per week     Comment: occasionally    Drug use: Never    Sexual activity: Yes     Partners: Male     Social Determinants of Health     Financial Resource Strain: Low Risk  (6/17/2024)    Overall Financial Resource Strain (CARDIA)     Difficulty of Paying Living Expenses: Not hard at all   Food Insecurity: No Food Insecurity (6/17/2024)    Hunger Vital Sign     Worried About Running Out of Food in the Last Year: Never true     Ran Out of Food in the Last Year: Never true   Transportation Needs: Unknown (6/17/2024)    PRAPARE - Transportation     Lack of Transportation (Non-Medical): No    [facility]    Family and Community Support    [facility] from [facility], [facility]    Abuse Screen   Housing Stability: Unknown (6/17/2024)    Housing Stability Vital Sign     Unstable Housing in the Last Year: No      Social History     Occupational History    Not on file   Tobacco Use    Smoking status: Never    Smokeless tobacco: Never   Vaping Use    Vaping status: Never Used   Substance and Sexual

## 2024-10-29 ENCOUNTER — TELEPHONE (OUTPATIENT)
Dept: PHARMACY | Facility: HOSPITAL | Age: 59
End: 2024-10-29

## 2024-10-29 DIAGNOSIS — M81.0 OSTEOPOROSIS OF MULTIPLE SITES: Primary | ICD-10-CM

## 2024-10-29 RX ORDER — DENOSUMAB 60 MG/ML
60 INJECTION SUBCUTANEOUS
Qty: 1 ML | Refills: 1 | Status: SHIPPED | OUTPATIENT
Start: 2024-10-29

## 2024-10-29 NOTE — TELEPHONE ENCOUNTER
Specialty Medication Service    Date: 10/29/2024  Patient's Name: Minal Heaton YOB: 1965            _____________________________________________________________________________________________     Minal Heaton is a 59 y.o.  female enrolled in the Specialty Medication Service program. Refill request received for Prolia.    Patient does have active CPA on file.   Patient last SMS pharmacist visit was within the last 6 months.  Patient last medical director visit was within the last year.  Patient has seen their specialist within the last year.   Labs were reviewed.   [facility] medical director referral is on file: yes  Next SMS visit is anticipated for 10/2025.   Correct [facility] department for prescribing yes    Chart reviewed. No significant concerns noted, patient is compliant with the program.     Will approve the refill for 2 refills, per the original provider order.      Valeriy Canales, Pharmacist  Specialty Medication Services    For Pharmacy Admin Tracking Only    Program: [facility]  CPA in place:  Yes  Recommendation Provided To: Pharmacy: 1  Intervention Detail: Refill(s) Provided  Intervention Accepted By: Pharmacy: 1  Time Spent (min): 15

## 2025-02-05 ENCOUNTER — PHARMACY VISIT (OUTPATIENT)
Dept: PHARMACY | Facility: HOSPITAL | Age: 60
End: 2025-02-05

## 2025-02-05 DIAGNOSIS — M81.0 OSTEOPOROSIS OF MULTIPLE SITES: Primary | ICD-10-CM

## 2025-02-05 NOTE — PROGRESS NOTES
Specialty Medication Follow up Virtual Visit  [facility]  [facility]  [address] [facility] [address]  Dept: [phone]  Dept Fax: [phone]  Loc: [phone]  Date of patient's visit: 2/5/2025  Patient's Name:  Minal Heaton YOB: 1965            Patient Care Team:  Maggy Landaverde MD as PCP - General (Pediatrics)  Maggy Landaverde MD as PCP - Empaneled Provider  ================================================================    REASON FOR VISIT/CHIEF COMPLAINT:  Osteoporosis    HISTORY OF PRESENTING ILLNESS:      Prolia     Specialty Medication: Prolia   Frequency: every 6 months  Indication: Osteoporosis of multiple sites   Initially Diagnosed: 6/2021  Additional Therapy:   Calcium-vitamin d 500-400 mg-unit 2 tabs po daily  Vitamin D 2000 units po daily  Previous Therapy:   Alendronate - bone pain (leg)      Next Prolia injection due in January 2025.     Specialist: Maggy Landaverde MD  [address], [address]  [place], [place] [id number] [phone]  Specialist Progress Note Available: Yes  Last Specialist Visit:  2/2/2024  Office visit note from 2/2/24:  \"57 y/o WF here for annual wellness visit. Patient has been on prolia for osteoprosis and has had 6 doses so far for treatment but she has only had minimal improvement in osteoporosis in Lspine and osteopenia in her hip. She has added resistance exercises 3 days a week also. Patient has a history of vitamin D deficiency but it has been well controlled and she has been taking calcium and vitamin D supplement since she was in her 30s. Mother had fractures due to osteoporosis despite treatment IV bisphosphonate. \"    Osteoporosis:          - This is a chronic problem. She was found to have osteoporosis on DEXA on 6/11/21. She has been on the Prolia since 7/2022. She had improvement on her most recent DEXA scan with an improvement of

## 2025-02-07 ENCOUNTER — OFFICE VISIT (OUTPATIENT)
Dept: PRIMARY CARE CLINIC | Age: 60
End: 2025-02-07
Payer: COMMERCIAL

## 2025-02-07 VITALS
SYSTOLIC BLOOD PRESSURE: 124 MMHG | TEMPERATURE: 98 F | DIASTOLIC BLOOD PRESSURE: 72 MMHG | HEART RATE: 91 BPM | WEIGHT: 139 LBS | BODY MASS INDEX: 21.77 KG/M2 | OXYGEN SATURATION: 99 %

## 2025-02-07 DIAGNOSIS — E78.00 PURE HYPERCHOLESTEROLEMIA: ICD-10-CM

## 2025-02-07 DIAGNOSIS — Z13.1 SCREENING FOR DIABETES MELLITUS: ICD-10-CM

## 2025-02-07 DIAGNOSIS — Z23 NEED FOR PNEUMOCOCCAL 20-VALENT CONJUGATE VACCINATION: ICD-10-CM

## 2025-02-07 DIAGNOSIS — M81.0 OSTEOPOROSIS OF MULTIPLE SITES: ICD-10-CM

## 2025-02-07 DIAGNOSIS — E55.9 VITAMIN D DEFICIENCY: ICD-10-CM

## 2025-02-07 DIAGNOSIS — Z13.0 SCREENING FOR DEFICIENCY ANEMIA: ICD-10-CM

## 2025-02-07 DIAGNOSIS — Z00.01 ENCOUNTER FOR WELL ADULT EXAM WITH ABNORMAL FINDINGS: Primary | ICD-10-CM

## 2025-02-07 DIAGNOSIS — M21.619 BUNION OF GREAT TOE: ICD-10-CM

## 2025-02-07 PROBLEM — R10.9 ABDOMINAL PAIN: Status: RESOLVED | Noted: 2022-04-16 | Resolved: 2025-02-07

## 2025-02-07 PROBLEM — R04.0 EPISTAXIS: Status: RESOLVED | Noted: 2022-01-21 | Resolved: 2025-02-07

## 2025-02-07 PROBLEM — H61.23 BILATERAL IMPACTED CERUMEN: Status: RESOLVED | Noted: 2022-01-21 | Resolved: 2025-02-07

## 2025-02-07 PROCEDURE — 90677 PCV20 VACCINE IM: CPT | Performed by: INTERNAL MEDICINE

## 2025-02-07 PROCEDURE — 99396 PREV VISIT EST AGE 40-64: CPT | Performed by: INTERNAL MEDICINE

## 2025-02-07 PROCEDURE — 90471 IMMUNIZATION ADMIN: CPT | Performed by: INTERNAL MEDICINE

## 2025-02-07 SDOH — ECONOMIC STABILITY: FOOD INSECURITY: WITHIN THE PAST 12 MONTHS, YOU WORRIED THAT YOUR FOOD WOULD RUN OUT BEFORE YOU GOT MONEY TO BUY MORE.: NEVER TRUE

## 2025-02-07 SDOH — ECONOMIC STABILITY: FOOD INSECURITY: WITHIN THE PAST 12 MONTHS, THE FOOD YOU BOUGHT JUST DIDN'T LAST AND YOU DIDN'T HAVE MONEY TO GET MORE.: NEVER TRUE

## 2025-02-07 ASSESSMENT — ENCOUNTER SYMPTOMS
COUGH: 0
VOICE CHANGE: 0
VOMITING: 0
EYE DISCHARGE: 0
SORE THROAT: 0
BLOOD IN STOOL: 0
SINUS PRESSURE: 0
EYE PAIN: 0
EYE REDNESS: 0
WHEEZING: 0
CHEST TIGHTNESS: 0
DIARRHEA: 0
ABDOMINAL PAIN: 0
RHINORRHEA: 0
SHORTNESS OF BREATH: 0
COLOR CHANGE: 0

## 2025-02-07 ASSESSMENT — PATIENT HEALTH QUESTIONNAIRE - PHQ9
SUM OF ALL RESPONSES TO PHQ QUESTIONS 1-9: 0
1. LITTLE INTEREST OR PLEASURE IN DOING THINGS: NOT AT ALL
SUM OF ALL RESPONSES TO PHQ QUESTIONS 1-9: 0
SUM OF ALL RESPONSES TO PHQ QUESTIONS 1-9: 0
2. FEELING DOWN, DEPRESSED OR HOPELESS: NOT AT ALL
2. FEELING DOWN, DEPRESSED OR HOPELESS: NOT AT ALL
SUM OF ALL RESPONSES TO PHQ QUESTIONS 1-9: 0
SUM OF ALL RESPONSES TO PHQ9 QUESTIONS 1 & 2: 0
SUM OF ALL RESPONSES TO PHQ9 QUESTIONS 1 & 2: 0
1. LITTLE INTEREST OR PLEASURE IN DOING THINGS: NOT AT ALL

## 2025-02-07 NOTE — PROGRESS NOTES
Well Adult Note  Name: Minal Heaton Today’s Date: 2025   MRN: [id number] Sex: Female   Age: 59 y.o. Ethnicity: Non- / Non    : 1965 Race: White (non-)      Minal Heaton is here for a well adult exam.          Assessment & Plan  1. Health maintenance.  Her Pap smear results were benign, and she tested negative for high-risk HPV subtypes. Her ASCVD score, calculated using her cholesterol levels from 2024, is 2.1 percent, indicating a low risk. She is due for a colonoscopy this month due to history of colon polyps in the past. She will receive the Prevnar 20 vaccine today. An A1c test will be conducted for diabetes screening.    2. Bilateral Bunions  She is considering bunion surgery (Lapiplasty) on both feet, starting with one foot in January. The potential benefits and recovery process of Lapiplasty were discussed, including the need to be non-weightbearing for 2 weeks and wearing a boot for 6 weeks. She was advised to proceed with the surgery before the condition worsens.    3. Osteoporosis L-spine-  She has been on Prolia for 3 years and hopes to see improvement in her bone density by summer. Her lumbar spine T-score has improved from -2.9 to -2.6. A referral to Dr. Kruger at the rheumatology office will be made if there is no significant improvement. An order for vitamin D and phosphate levels will be placed, which will be due in 6 months, coinciding with her next Prolia dose.    4. Vitamin D deficiency.  Her last vitamin D level was 58.2 on 06/10/2024 and patient takes over the counter vitamin D 2000 units/day. An order for vitamin D and phosphate levels will be placed, which will be due in 6 months, coinciding with her next Prolia dose.    5. Pure Hypercholesterolemia on Be Well Screening 2024.  Her cholesterol levels are slightly elevated with  and total cholesterol 229, but her HDL is excellent at 80.     The 10-year ASCVD risk score (Tomasz LIVE, et al., 2019)

## 2025-04-08 LAB
CHOLEST SERPL-MCNC: 209 MG/DL (ref 0–199)
GLUCOSE SERPL-MCNC: 79 MG/DL (ref 70–99)
HDLC SERPL-MCNC: 77 MG/DL (ref 40–60)
LDLC SERPL CALC-MCNC: 114 MG/DL
TRIGL SERPL-MCNC: 90 MG/DL (ref 0–149)

## 2025-04-24 ENCOUNTER — ANESTHESIA EVENT (OUTPATIENT)
Dept: OPERATING ROOM | Age: 60
End: 2025-04-24

## 2025-04-28 ENCOUNTER — ANESTHESIA (OUTPATIENT)
Dept: OPERATING ROOM | Age: 60
End: 2025-04-28

## 2025-04-28 ENCOUNTER — HOSPITAL ENCOUNTER (OUTPATIENT)
Age: 60
Setting detail: OUTPATIENT SURGERY
Discharge: HOME | End: 2025-04-28
Attending: INTERNAL MEDICINE | Admitting: INTERNAL MEDICINE

## 2025-04-28 ENCOUNTER — APPOINTMENT (OUTPATIENT)
Dept: OPERATING ROOM | Age: 60
End: 2025-04-28
Attending: INTERNAL MEDICINE

## 2025-04-28 VITALS
HEART RATE: 66 BPM | DIASTOLIC BLOOD PRESSURE: 63 MMHG | BODY MASS INDEX: 21.19 KG/M2 | RESPIRATION RATE: 16 BRPM | HEIGHT: 67 IN | SYSTOLIC BLOOD PRESSURE: 98 MMHG | OXYGEN SATURATION: 98 % | TEMPERATURE: 96.8 F | WEIGHT: 135 LBS

## 2025-04-28 PROCEDURE — G0105 COLORECTAL SCRN; HI RISK IND: HCPCS

## 2025-04-28 RX ORDER — SODIUM CHLORIDE, SODIUM LACTATE, POTASSIUM CHLORIDE, CALCIUM CHLORIDE 600; 310; 30; 20 MG/100ML; MG/100ML; MG/100ML; MG/100ML
INJECTION, SOLUTION INTRAVENOUS CONTINUOUS
Status: DISCONTINUED | OUTPATIENT
Start: 2025-04-28 | End: 2025-04-28 | Stop reason: HOSPADM

## 2025-04-28 RX ORDER — PROPOFOL 10 MG/ML
INJECTION, EMULSION INTRAVENOUS
Status: DISCONTINUED | OUTPATIENT
Start: 2025-04-28 | End: 2025-04-28 | Stop reason: SDUPTHER

## 2025-04-28 RX ORDER — LIDOCAINE HYDROCHLORIDE 10 MG/ML
INJECTION, SOLUTION INFILTRATION; PERINEURAL
Status: DISCONTINUED | OUTPATIENT
Start: 2025-04-28 | End: 2025-04-28 | Stop reason: SDUPTHER

## 2025-04-28 RX ADMIN — SODIUM CHLORIDE, SODIUM LACTATE, POTASSIUM CHLORIDE, CALCIUM CHLORIDE: 600; 310; 30; 20 INJECTION, SOLUTION INTRAVENOUS at 07:51

## 2025-04-28 RX ADMIN — PROPOFOL 180 MG: 10 INJECTION, EMULSION INTRAVENOUS at 09:20

## 2025-04-28 RX ADMIN — LIDOCAINE HYDROCHLORIDE 40 MG: 10 INJECTION, SOLUTION INFILTRATION; PERINEURAL at 09:20

## 2025-04-28 ASSESSMENT — PAIN - FUNCTIONAL ASSESSMENT: PAIN_FUNCTIONAL_ASSESSMENT: NONE - DENIES PAIN

## 2025-04-28 ASSESSMENT — LIFESTYLE VARIABLES: SMOKING_STATUS: 0

## 2025-04-28 NOTE — ANESTHESIA POSTPROCEDURE EVALUATION
Department of Anesthesiology  Postprocedure Note    Patient: Minal Heaton  MRN: [id number]  YOB: 1965  Date of evaluation: 4/28/2025    Procedure Summary       Date: 04/28/25 Room / Location: [place] [address] / [facility]    Anesthesia Start: 0915 Anesthesia Stop: 0938    Procedure: COLORECTAL CANCER SCREENING, NOT HIGH RISK (Abdomen) Diagnosis:       Screen for colon cancer      History of colon polyps      (Screen for colon cancer [Z12.11])      (History of colon polyps [Z86.0100])    Surgeons: Rosalio Anderson MD Responsible Provider: Britt Higgins APRN - CRNA    Anesthesia Type: general, TIVA ASA Status: 2            Anesthesia Type: No value filed.    Pilar Phase I:      Pilar Phase II:      Anesthesia Post Evaluation    Patient location during evaluation: bedside  Patient participation: complete - patient participated  Level of consciousness: sleepy but conscious  Pain score: 0  Airway patency: patent  Nausea & Vomiting: no nausea and no vomiting  Cardiovascular status: hemodynamically stable and blood pressure returned to baseline  Respiratory status: acceptable and nasal cannula  Hydration status: stable  Pain management: adequate    No notable events documented.

## 2025-04-28 NOTE — OP NOTE
the bowel. The colonoscope was removed from the patient, and the procedure was terminated.  Findings are listed below.        Findings:     The mucosa appeared normal throughout the entire examined colon.    Retroflexion in the rectum was normal and revealed no further abnormalities.    Recommendations:  1. Repeat colonoscopy: 5 years, due to polyp history.    Findings and recommendations were discussed w/ the patient. A copy of the images was provided.    IZuleyka, am scribing for and in the presence of Dr. Rosalio Anderson MD.  Electronically signed by Zuleyka Gu RN on 4/28/2025 at 9:24 AM    I personally performed the services described in this documentation as scribed by Zuleyka Gu, and it appears accurate and complete.     Rosalio Anderson MD  4/28/2025

## 2025-04-28 NOTE — DISCHARGE INSTRUCTIONS
Recommendations:  1. Repeat colonoscopy: 5 years, due to polyp history.    POST-OP ORDERS: ENDOSCOPY & COLONOSCOPY:    1. Rest today.    2. DO NOT eat or drink until wide awake; eat your usual diet today in moderate amount only.    3. DO NOT drive today.    4. Call physician if you have severe pain, vomiting, fever, rectal bleeding or black bowel movements.    5.  If a biopsy was taken or a polyp removed, you should expect to hear results in about 21 days.  If you have heard nothing from your physician by then, call the office for results.    6.  Discharge home when patient awake, vitals signs stable and tolerating liquids.

## 2025-04-28 NOTE — ANESTHESIA PRE PROCEDURE
Department of Anesthesiology  Preprocedure Note       Name:  Minal Heaton   Age:  59 y.o.  :  1965                                          MRN:  [id number]         Date:  2025      Surgeon: Surgeon(s):  Rosalio Anderson MD    Procedure: Procedure(s):  COLORECTAL CANCER SCREENING, NOT HIGH RISK    Medications prior to admission:   Prior to Admission medications    Medication Sig Start Date End Date Taking? Authorizing Provider   denosumab (PROLIA) 60 MG/ML SOSY SC injection Inject 1 mL into the skin every 6 months 10/29/24  Yes Ash Campos MD   Multiple Vitamins-Minerals (THERAPEUTIC MULTIVITAMIN-MINERALS) tablet Take 1 tablet by mouth daily   Yes ProviderTereso MD   Cholecalciferol (VITAMIN D) 2000 units CAPS capsule Take 1 capsule by mouth daily   Yes ProviderTereso MD   Calcium Carb-Cholecalciferol (CALCIUM-VITAMIN D) 500-400 MG-UNIT TABS 2 tabs daily 17  Yes Chandan Domínguez MD   estrogens conjugated (PREMARIN) 0.625 MG/GM CREA vaginal cream Place 0.5 g vaginally Twice a Week Use nightly at bedtime for 2 weeks, then twice weekly at bedtime  Patient taking differently: Place 0.5 g vaginally once a week Use nightly at bedtime for 2 weeks, then twice weekly at bedtime 24   Liz Navarrete APRN       Current medications:    Current Facility-Administered Medications   Medication Dose Route Frequency Provider Last Rate Last Admin   • lactated ringers infusion   IntraVENous Continuous Roslaio Anderson  mL/hr at 25 0751 New Bag at 25 0751       Allergies:    Allergies   Allergen Reactions   • Alendronate Other (See Comments)     Bone pain   • Percocet [Oxycodone-Acetaminophen] Nausea Only       Problem List:    Patient Active Problem List   Diagnosis Code   • Breast lump N63.0   • Fibrocystic breast disease N60.19   • Dense breasts R92.30   • Osteopenia M85.80   • Osteoporosis of multiple sites M81.0   • Vitamin D deficiency E55.9   • Bunion of great toe M21.619  Yes

## 2025-05-22 ENCOUNTER — PATIENT MESSAGE (OUTPATIENT)
Dept: PRIMARY CARE CLINIC | Age: 60
End: 2025-05-22

## 2025-05-22 DIAGNOSIS — M81.0 OSTEOPOROSIS OF MULTIPLE SITES: Primary | ICD-10-CM
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
## 2025-05-28 RX ORDER — DOXYCYCLINE HYCLATE 100 MG
100 TABLET ORAL 2 TIMES DAILY
Qty: 28 TABLET | Refills: 0 | Status: SHIPPED | OUTPATIENT
Start: 2025-05-28 | End: 2025-06-11

## 2025-06-25 ENCOUNTER — TELEPHONE (OUTPATIENT)
Dept: PHARMACY | Facility: HOSPITAL | Age: 60
End: 2025-06-25

## 2025-06-25 DIAGNOSIS — M81.0 OSTEOPOROSIS OF MULTIPLE SITES: Primary | ICD-10-CM

## 2025-06-25 NOTE — TELEPHONE ENCOUNTER
Specialty Medication Service    Date: 6/25/2025  Patient's Name: Minal Heaton YOB: 1965            _____________________________________________________________________________________________      PA for patient's Prolia has used 2 out of 2 approved fills. PA started in [facility] (key: [id number]) and sent to patient's original provider, Dr. Landaverde.     Unable to complete PA via SMS CPA due to no current medical director. A message has been routed to patient's original provider's office with [facility] key. Will follow-up for updates on PA status.     Miracle Camarena, PharmD, BCACP  Ambulatory Clinical Pharmacist   Specialty Medication Services   Phone: [phone] option 4  6/25/2025 2:42 PM

## 2025-07-02 ENCOUNTER — HOSPITAL ENCOUNTER (OUTPATIENT)
Dept: WOMENS IMAGING | Age: 60
Discharge: HOME | End: 2025-07-02
Attending: INTERNAL MEDICINE
Payer: COMMERCIAL

## 2025-07-02 DIAGNOSIS — M81.0 OSTEOPOROSIS OF MULTIPLE SITES: ICD-10-CM

## 2025-07-02 PROCEDURE — 77080 DXA BONE DENSITY AXIAL: CPT

## 2025-07-03 NOTE — TELEPHONE ENCOUNTER
Specialty Medication Service    Date: 7/3/2025  Patient's Name: Minal Heaton YOB: 1965            _____________________________________________________________________________________________    Patient's PA for Prolia has been approved through 6/26/26.     Miracle Camarena, PharmD, BCACP  Ambulatory Clinical Pharmacist   Specialty Medication Services   Phone: [phone] option 4  7/3/2025 10:39 AM    For Pharmacy Admin Tracking Only    Program: [facility]  CPA in place:  No  Recommendation Provided To: Provider: 1 via Note to Provider  Intervention Detail: Benefit Assistance  Intervention Accepted By: Provider: 1   Time Spent (min): 15

## 2025-07-07 ENCOUNTER — PATIENT MESSAGE (OUTPATIENT)
Dept: PRIMARY CARE CLINIC | Age: 60
End: 2025-07-07

## 2025-07-07 DIAGNOSIS — M85.851 OSTEOPENIA OF BOTH HIPS: ICD-10-CM

## 2025-07-07 DIAGNOSIS — M81.0 OSTEOPOROSIS OF LUMBAR SPINE: Primary | ICD-10-CM

## 2025-07-07 DIAGNOSIS — M81.0 OSTEOPOROSIS OF MULTIPLE SITES: ICD-10-CM

## 2025-07-07 DIAGNOSIS — N95.1 POST MENOPAUSAL SYNDROME: ICD-10-CM

## 2025-07-07 DIAGNOSIS — M85.852 OSTEOPENIA OF BOTH HIPS: ICD-10-CM

## 2025-07-07 PROBLEM — M85.80 OSTEOPENIA: Status: RESOLVED | Noted: 2017-11-04 | Resolved: 2025-07-07

## 2025-07-29 ENCOUNTER — TRANSCRIBE ORDERS (OUTPATIENT)
Dept: ADMINISTRATIVE | Age: 60
End: 2025-07-29

## 2025-07-29 DIAGNOSIS — Z12.31 VISIT FOR SCREENING MAMMOGRAM: Primary | ICD-10-CM

## 2025-07-31 ENCOUNTER — HOSPITAL ENCOUNTER (OUTPATIENT)
Dept: WOMENS IMAGING | Age: 60
Discharge: HOME | End: 2025-07-31
Payer: COMMERCIAL

## 2025-07-31 VITALS — WEIGHT: 135 LBS | HEIGHT: 67 IN | BODY MASS INDEX: 21.19 KG/M2

## 2025-07-31 DIAGNOSIS — Z12.31 VISIT FOR SCREENING MAMMOGRAM: ICD-10-CM

## 2025-07-31 PROCEDURE — 77067 SCR MAMMO BI INCL CAD: CPT

## 2025-07-31 PROCEDURE — 77063 BREAST TOMOSYNTHESIS BI: CPT

## 2025-08-22 ENCOUNTER — OFFICE VISIT (OUTPATIENT)
Dept: OBGYN CLINIC | Age: 60
End: 2025-08-22

## 2025-08-22 VITALS
DIASTOLIC BLOOD PRESSURE: 83 MMHG | SYSTOLIC BLOOD PRESSURE: 125 MMHG | WEIGHT: 137 LBS | HEIGHT: 67 IN | BODY MASS INDEX: 21.5 KG/M2 | HEART RATE: 83 BPM

## 2025-08-22 DIAGNOSIS — G47.9 SLEEP DISTURBANCE: ICD-10-CM

## 2025-08-22 DIAGNOSIS — M85.80 OSTEOPENIA AFTER MENOPAUSE: ICD-10-CM

## 2025-08-22 DIAGNOSIS — R45.4 IRRITABILITY: ICD-10-CM

## 2025-08-22 DIAGNOSIS — N81.4 UTERINE PROLAPSE: ICD-10-CM

## 2025-08-22 DIAGNOSIS — Z76.0 MEDICATION REFILL: ICD-10-CM

## 2025-08-22 DIAGNOSIS — Z71.89 COUNSELING FOR HORMONE REPLACEMENT THERAPY: ICD-10-CM

## 2025-08-22 DIAGNOSIS — M81.0 OSTEOPOROSIS WITHOUT CURRENT PATHOLOGICAL FRACTURE, UNSPECIFIED OSTEOPOROSIS TYPE: ICD-10-CM

## 2025-08-22 DIAGNOSIS — Z11.51 SCREENING FOR HPV (HUMAN PAPILLOMAVIRUS): ICD-10-CM

## 2025-08-22 DIAGNOSIS — N81.4 CYSTOCELE WITH PROLAPSE: ICD-10-CM

## 2025-08-22 DIAGNOSIS — Z12.4 SCREENING FOR CERVICAL CANCER: ICD-10-CM

## 2025-08-22 DIAGNOSIS — Z78.0 OSTEOPENIA AFTER MENOPAUSE: ICD-10-CM

## 2025-08-22 DIAGNOSIS — Z01.419 WELL WOMAN EXAM WITH ROUTINE GYNECOLOGICAL EXAM: Primary | ICD-10-CM

## 2025-08-22 LAB — HPV16+18+H RISK 12 DNA SPEC-IMP: NORMAL

## 2025-08-22 RX ORDER — PROGESTERONE 100 MG/1
100 CAPSULE ORAL NIGHTLY
Qty: 30 CAPSULE | Refills: 11 | Status: SHIPPED | OUTPATIENT
Start: 2025-08-22

## 2025-08-22 RX ORDER — CONJUGATED ESTROGENS 0.62 MG/G
0.5 CREAM VAGINAL WEEKLY
Qty: 30 G | Refills: 2 | Status: SHIPPED | OUTPATIENT
Start: 2025-08-22

## 2025-09-02 LAB
HPV HR 12 DNA SPEC QL NAA+PROBE: NOT DETECTED
HPV16 DNA SPEC QL NAA+PROBE: NOT DETECTED
HPV16+18+H RISK 12 DNA SPEC-IMP: NORMAL
HPV18 DNA SPEC QL NAA+PROBE: NOT DETECTED

## (undated) DEVICE — ADHESIVE SKIN CLSR 0.7ML TOP DERMBND ADV

## (undated) DEVICE — SUPPLEMENT DIGESTIVE H2O SOL GI-EASE

## (undated) DEVICE — SEALER LAP L37CM MARYLAND JAW OPN NANO COAT MULTIFUNCTIONAL

## (undated) DEVICE — BAG SPEC REM 224ML W4XL6IN DIA10MM 1 HND GYN DISP ENDOPCH

## (undated) DEVICE — GLOVE SURG SZ 7 CRM LTX FREE POLYISOPRENE POLYMER BEAD ANTI

## (undated) DEVICE — CANNULA NSL AD L7FT DIV O2 CO2 W/ M LUERLOCK TRMPT CONN

## (undated) DEVICE — TROCAR: Brand: KII® SLEEVE

## (undated) DEVICE — BRUSH ENDOSCP 2 END CHN HEDGEHOG

## (undated) DEVICE — GENERAL LAP CDS

## (undated) DEVICE — DECANTER VI VENT W/ VLV FOR ASEP TRNSF OF FLD

## (undated) DEVICE — RELOAD STPL SZ 0 L45MM DIA3.5MM 0DEG STD REG TISS BLU TI

## (undated) DEVICE — TOTAL TRAY, 16FR 10ML SIL FOLEY, URN: Brand: MEDLINE

## (undated) DEVICE — SINGLE PORT MANIFOLD: Brand: NEPTUNE 2

## (undated) DEVICE — GLOVE SURG SZ 75 CRM LTX FREE POLYISOPRENE POLYMER BEAD ANTI

## (undated) DEVICE — PUMP SUC IRR TBNG L10FT W/ HNDPC ASSEMB STRYKEFLOW 2

## (undated) DEVICE — SUTURE SZ 0 27IN 5/8 CIR UR-6  TAPER PT VIOLET ABSRB VICRYL J603H

## (undated) DEVICE — ENDO KIT,LOURDES HOSPITAL: Brand: MEDLINE INDUSTRIES, INC.

## (undated) DEVICE — TROCAR: Brand: KII FIOS FIRST ENTRY

## (undated) DEVICE — TRAY,FOLEY,100% SIL,16FR 10ML: Brand: MEDLINE

## (undated) DEVICE — CLEANING SPONGE: Brand: KOALA™

## (undated) DEVICE — CUTTER ENDOSCP L340MM LIN ARTC SGL STROKE FIRING ENDOPATH

## (undated) DEVICE — COLON KIT WITH 1.1 OZ ORCA HYDRA SEAL 2 GOWN

## (undated) DEVICE — SUTURE MCRYL SZ 4-0 L18IN ABSRB UD L19MM PS-2 3/8 CIR PRIM Y496G

## (undated) DEVICE — SUTURE VCRL SZ 0 L27IN ABSRB VLT L36MM UR-5 5/8 CIR J376H

## (undated) DEVICE — SOLUTION IV 1000ML 0.9% SOD CHL

## (undated) DEVICE — GLOVE SURG SZ 75 L12IN FNGR THK79MIL GRN LTX FREE

## (undated) DEVICE — ELECTRODE PT RET AD L9FT HI MOIST COND ADH HYDRGEL CORDED

## (undated) DEVICE — ADAPTER CLEANING PORPOISE CLEANING